# Patient Record
Sex: MALE | Race: WHITE | NOT HISPANIC OR LATINO | Employment: UNEMPLOYED | URBAN - METROPOLITAN AREA
[De-identification: names, ages, dates, MRNs, and addresses within clinical notes are randomized per-mention and may not be internally consistent; named-entity substitution may affect disease eponyms.]

---
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
## 2021-01-04 ENCOUNTER — LAB (OUTPATIENT)
Dept: LAB | Facility: MEDICAL CENTER | Age: 21
End: 2021-01-04
Payer: COMMERCIAL

## 2021-01-05 ENCOUNTER — TELEMEDICINE (OUTPATIENT)
Dept: DERMATOLOGY | Facility: CLINIC | Age: 21
End: 2021-01-05
Payer: COMMERCIAL

## 2021-01-05 DIAGNOSIS — L70.0 ACNE VULGARIS: Primary | ICD-10-CM

## 2021-01-05 DIAGNOSIS — Z79.899 HIGH RISK MEDICATION USE: ICD-10-CM

## 2021-01-05 PROCEDURE — 99214 OFFICE O/P EST MOD 30 MIN: CPT | Performed by: DERMATOLOGY

## 2021-01-05 PROCEDURE — 1036F TOBACCO NON-USER: CPT | Performed by: DERMATOLOGY

## 2021-01-05 RX ORDER — ISOTRETINOIN 30 MG/1
30 CAPSULE ORAL 2 TIMES DAILY
Qty: 60 CAPSULE | Refills: 0 | Status: SHIPPED | OUTPATIENT
Start: 2021-01-05 | End: 2021-02-17 | Stop reason: SDUPTHER

## 2021-01-05 NOTE — PROGRESS NOTES
Virtual Regular Visit      Assessment/Plan:    Problem List Items Addressed This Visit        Musculoskeletal and Integument    Acne vulgaris - Primary               Reason for visit is accutane follow up  Chief Complaint   Patient presents with    Virtual Regular Visit        Encounter provider Dom Louise MD    Provider located at [address] [place] [address] [address] [facility]  [phone]      Recent Visits  No visits were found meeting these conditions  Showing recent visits within past 7 days and meeting all other requirements     Today's Visits  Date Type Provider Dept   01/05/21 Telemedicine Dom Louise MD [facility] [place]   Showing today's visits and meeting all other requirements     Future Appointments  No visits were found meeting these conditions  Showing future appointments within next 150 days and meeting all other requirements        The patient was identified by name and date of birth  Vaughn Pantoja was informed that this is a telemedicine visit and that the visit is being conducted through [facility] [place] and patient was informed that this is not a secure, HIPAA-compliant platform  He agrees to proceed     My office door was closed  The patient was notified the following individuals were present in the room mamie van  He acknowledged consent and understanding of privacy and security of the video platform  The patient has agreed to participate and understands they can discontinue the visit at any time  Patient is aware this is a billable service  Lani Gonzalez is a 24 y o  male          HPI     Past Medical History:   Diagnosis Date    Acne     [facility]izure [facility])        Past Surgical History:   Procedure Laterality Date    DENTAL SURGERY         Current Outpatient Medications   Medication Sig Dispense Refill    clindamycin (CLEOCIN T) 1 % lotion Apply topically 2 (two) times a day 60 mL 11    dexmethylphenidate (FOCALIN XR) 10 MG 24 hr capsule Take 1 cap by mouth in am      dexmethylphenidate (FOCALIN XR) 5 MG 24 hr capsule Take 1 cap by mouth after school      dexmethylphenidate (FOCALIN) 2 5 MG tablet 1 tab after school      ISOtretinoin (ACCUTANE) 30 MG capsule Take 1 capsule (30 mg total) by mouth daily ipledge [id number] 30 capsule 0    sertraline (ZOLOFT) 50 mg tablet Take 50 mg by mouth daily       No current facility-administered medications for this visit  No Known Allergies    Review of Systems    Video Exam    Vitals:       Physical Exam     I spent 20 minutes directly with the patient during this visit      VIRTUAL VISIT DISCLAIMER    Catrina Neumann acknowledges that he has consented to an online visit or consultation  He understands that the online visit is based solely on information provided by him, and that, in the absence of a face-to-face physical evaluation by the physician, the diagnosis he receives is both limited and provisional in terms of accuracy and completeness  This is not intended to replace a full medical face-to-face evaluation by the physician  Catrina Neumann understands and accepts these terms  [address] [facility] VIRTUAL Clinic Note     Patient Name: Catrina Neumann  Encounter Date: 01/05/2021  If patient is a minor, he/she is accompanied, virtually, by person claiming to be:      Have you been cared for by a [facility] Dermatologist in the last 3 years and, if so, which one? YES, Dr Lea Calixto     · Have you traveled outside of the [address] [place] [address] in the past 3 months or outside of the [facility] area in the last 2 weeks? No     May we call your Preferred Phone number to discuss your specific medical information? Yes     May we leave a detailed message that includes your specific medical information?         Yes      Today's Chief Concerns:   Concern #1:  Accutane follow up       Past Medical History:  Have you personally ever had or currently have any of the following? · Skin cancer (such as Melanoma, Basal Cell Carcinoma, Squamous Cell Carcinoma? (If Yes, please provide more detail)- No  · Eczema: No  · Psoriasis: No  · HIV/AIDS: No  · Hepatitis B or C: No  · Tuberculosis: No  · Systemic Immunosuppression such as Diabetes, Biologic or Immunotherapy, Chemotherapy, Organ Transplantation, Bone Marrow Transplantation (If YES, please provide more detail): No  · Radiation Treatment (If YES, please provide more detail): No  · Any other major medical conditions/concerns? (If Yes, which types)- No    Social History:     What is/was your primary occupation? Denies      What are your hobbies/past-times? Denies     Family History:  Have any of your "first degree relatives" (parent, brother, sister, or child) had any of the following       · Skin cancer such as Melanoma or Merkel Cell Carcinoma or Pancreatic Cancer? No  · Eczema, Asthma, Hay Fever or Seasonal Allergies: No  · Psoriasis or Psoriatic Arthritis: No  · Do any other medical conditions seem to run in your family? If Yes, what condition and which relatives? No    Current Medications:         Current Outpatient Medications:     clindamycin (CLEOCIN T) 1 % lotion, Apply topically 2 (two) times a day, Disp: 60 mL, Rfl: 11    dexmethylphenidate (FOCALIN XR) 10 MG 24 hr capsule, Take 1 cap by mouth in am, Disp: , Rfl:     dexmethylphenidate (FOCALIN XR) 5 MG 24 hr capsule, Take 1 cap by mouth after school, Disp: , Rfl:     dexmethylphenidate (FOCALIN) 2 5 MG tablet, 1 tab after school, Disp: , Rfl:     ISOtretinoin (ACCUTANE) 30 MG capsule, Take 1 capsule (30 mg total) by mouth daily ipledge [id number], Disp: 30 capsule, Rfl: 0    sertraline (ZOLOFT) 50 mg tablet, Take 50 mg by mouth daily, Disp: , Rfl:       Review of Systems:  Have you recently had or currently have any of the following? If YES, what are you doing for the problem?     · Fever, chills or unintended weight loss: No  · Sudden loss or change in your vision: No  · Nausea, vomiting or blood in your stool: No  · Painful or swollen joints: No  · Wheezing or cough: No  · Changing mole or non-healing wound: No  · Nosebleeds: No  · Excessive sweating: No  · Easy or prolonged bleeding? No  · Over the last 2 weeks, how often have you been bothered by the following problems? · Taking little interest or pleasure in doing things: 1-3 times a week  · Feeling down, depressed, or hopeless: 1 - Not at All  · Rapid heartbeat with epinephrine:  No    · FEMALES ONLY:    · Are you pregnant or planning to become pregnant? No  · Are you currently or planning to be nursing or breast feeding? No    · Any known allergies?      No Known Allergies      Physical Exam:     Was a chaperone (Derm Clinical Assistant) present throughout the entire virtual Physical Exam? Yes    o     CONSTITUTIONAL:   Appearance: alert, well appearing, and in no distress  PSYCH: Normal mood and affect  EYES: Normal appearing eyes with normal color; no obvious deformities  ENT: Normal ears, nose, lips and neck with normal color and no obvious deformities; no obvious difficulty swallowing  CARDIOVASCULAR: No obvious edema; no obvious jugular venous distension  RESPIRATORY: Normal appearing respirations; no obvious shortness of breath  HEME/LYMPH/IMMUNO:  Normal color without obvious pallor, jaundice, petechiae or bleeding; no obvious cervical chain masses    SKIN:  FULL ORGAN SYSTEM EXAM  Hair, Scalp, Ears, Face Normal except as noted below in Assessment   Neck, Cervical Chain Nodes    Right Arm/Hand/Fingers    Left Arm/Hand/Fingers    Chest/Breasts/Axillae    Abdomen, Umbilicus    Back/Spine Normal except as noted below in Assessment   Groin/Genitalia/Buttocks    Right Leg, Foot, Toes    Left Leg, Foot, Toes         Assessment and Plan by Diagnosis:    History of Present Condition:     Duration:  How long has this been an issue for you?    o  years    Location Affected:  Where on the body is this affecting you?    o  face and shoulders    Quality:  Is there any bleeding, pain, itch, burning/irritation, or redness associated with the skin lesion? o  denies    Severity:  Describe any bleeding, pain, itch, burning/irritation, or redness on a scale of 1 to 10 (with 10 being the worst)  o  denies    Timing:  Does this condition seem to be there pretty constantly or do you notice it more at specific times throughout the day? o  constant    Context:  Have you ever noticed that this condition seems to be associated with specific activities you do?    o  denies    Modifying Factors:    o Anything that seems to make the condition worse?    -  denies   o What have you tried to do to make the condition better?    -  accutane    Associated Signs and Symptoms:  Does this skin lesion seem to be associated with any of the following:  o  denies      1  ISOTRETINOIN "ACCUTANE": MALE    Age:20 y o    Weight: unable to obtain virtual visit   Pulse: unable to obtain virtual visit   Blood Pressure: unable to obtain virtual visit     Ipledge number: [id number]  Last 4 digits SS: 1957      "Goal Dose" in mg (125 mg x weight in kg): 8,388 mg    Interim month   "Daily Dose" of Isotretinoin the patient has actually been taking since last visit (this is usually what was prescribed): 30 mg   "Total # of Days" patient took Isotretinoin since last visit (this is usually 30):  30 days   "Total Monthly Dose" in mg since last visit (this equals "Daily Dose" x "Total # of Days"): 900 mg    Cumulative dosage   "Total cumulative Dose" in mg (add the above "Total Monthly Dose" with "Total Cumulative Dose" from last visit: 900 mg        Symptoms   Conjunctivitis: No   Night Blindness/ Issues with night vision: No   Focusing Problems: No   Dry Lips/Eyes: YES   Dry Skin: YES   Rash: No   Nose Bleeds: No   Angular cheilitis (cracking in corner of lips): No   Headaches: No   Photosensitivity: YES   Dry Anus: No   Depression: No   Mood Changes: No   Suicidal thoughts: No   Fatigue: No   Weight Loss: No   Muscle Pain/Stiffness: No   Abdominal pain: No   Diarrhea: No   Bloody stools: No         Physical Exam   Psychiatric/Mood:    Anatomic Location Affected:  Cheeks and chin   Morphological Description:  o Open/Closed Comedones:  - Few ("Mild")  o Inflammatory Papules/Pustules:  - Few ("Mild")  o Nodules:  - Few ("Mild")  o Scarring:  - Few ("Mild")  o Excoriations:  - No evidence ("Clear")  o Local Skin Redness/Erythema:  - Few ("Mild")  o Local Skin Dryness/Scaling:  - No evidence ("Clear")  o Local Skin Dyspigmentation:  - No evidence ("Clear")   Pertinent Positives:   Pertinent Negatives:      Labs   Date of last labs: 1/04/2021   Completed: YES   Labs reviewed: within normal limits    DIAGNOSES:     Acne Vulgaris   High Risk Medication   Medication Monitoring   Xerosis (see below for patient education)    Assessment and Plan:   Target Total Dose per KILOgram:  125 mg/KILOgram (this may change this on a patient-by-patient basis)   Planned daily dose for next 30 days: 60 mg    Please remember to add patient's "Ipledge number" to actual prescription):     Return to clinic in about 1 month, please review ipledge guidelines in terms of timing (see below for patient education)   Get labs 1-2 days before next appointment: YES   Patient confirmed in iPledge: YES   Patients counseled:  - Cannot donate blood while taking isotretinoin and for 1 month after completing therapy  YES  - Do not share medication with anyone   YES  - Possible side effects discussed, including sun sensitivity, dry lips/eyes/skin, headaches, blurry vision (pseudotumor cerebri), muscle/joint aches, GI upset, bloody stools (IBD including Crohns/Ulcerative Colitis), jaundice, liver dysfunction, lipid abnormalities, bone marrow dysfunction, mood effects, thoughts of hurting oneself or others, and flaring of acne (acne fulminans/SAPPHO)  YES  - Report any side effects of mood swings or depression and stop medication upon occurrence  YES      Isotretinoin for Acne   Isotretinoin is a retinoid medication that is taken by mouth to treat severe nodular acne  Typically, it is used once other acne treatments have not worked, such as oral antibiotics  Usually isotretinoin is taken for 4 to 6 months, although the length of treatment can vary from person to person  While most patients acne improves and may even clear with this medication, in 20% of patients acne can come back  This requires additional acne treatment or even a second cycle of isotretinoin  How should I take isotretinoin?  Isotretinoin dosing is weight-based and should be taken exactly as prescribed   If you miss a dose, skip that dose  Do not take 2 doses at the same time   Take with food to help with absorption   All instructions in the iPLEDGE program packet (www Break30) that was provided must be followed (see below for highlights)   You will get a 30 day supply of isotretinoin at a time  It cannot be automatically refilled  Make certain that you have been given enough medication to last 30 days as pharmacists are unable to refill or make changes within a month   You must return to your dermatologist every month to make sure you are not having any serious side effects from isotretinoin  For female patients, this visit will always include a monthly pregnancy test  Other laboratory studies, including liver function tests, cholesterol and triglycerides, must also be conducted before and during treatment  What should I avoid while taking isotretinoin?  Do not donate blood while take isotretinoin or until 1 months after coming off the medication   Do not have cosmetic procedures to smooth your skin, including waxing, dermabrasion, or laser procedures, while taking this medication and for at least 6 months after you stop       Do not share isotretinoin with any other people  It can cause birth defects and other serious health problems   Do not use any other acne medications, including medicated washes, cleansers, creams or antibiotic pills during your treatment with isotretinoin unless expressly directed to by your dermatologist      Initiating isotretinoin & the iPLEDGE Program    The iPLEDGE Program is a strict, government-required program to prevent females from becoming pregnant while on isotretinoin  All females and males must participate  Note: Your provider must follow this program and cannot change any of the requirements   Before starting isotretinoin, your provider will talk to you about the safe use of this medication and you will need to sign consent forms in order to receive treatment   If you fail to keep appointments, you will be unable to get your prescription filled   For male patients and women of non-childbearing age: There is no waiting period  Once laboratory tests are done, treatment can start   For more information, visit: [email or web address]    What are the possible side effects of isotretinoin? What should I do? Most side effects from isotretinoin are mild and can be easily improved with simple remedies  Others are more concerning   Dry skin and eyes, chapped lips and dry nose that may lead to nosebleeds  o Dry Skin: Apply sensitive skin moisturizers to dry skin at least two times a day  You may need sunscreen (SPF 30) in the morning and to reapply when outside  o Dry Eyes: Use saline eye drops or artificial tears  o Dry Nose/Nosebleeds: Use saline nasal spray (ex  Ayr) during the day and at bedtime  To stop nosebleeds, hold pressure  If this does not work, call your dermatologist     o Chapped lips: apply petrolatum-based lip balms routinely  Avoid anything medicated   Contact your dermatologist for excessive dryness, cracks, tenderness or pain   Increased blood fats and cholesterol (usually in patients with a personal or family history of cholesterol or triglyceride problems)   Vision problems affecting your ability to see in the dark and drive at night   Bone, muscle and tendon aches can occur  Additional stretching before and after activities may help relieve aches  If you are otherwise healthy, consider the use of ibuprofen or naproxen  If you are unsure if you can use these pain medications, ask your doctor first  Also, call your doctor if you experience severe back pain, joint pain, or a broken bone    Changes in mood, including anxiety, depressive symptoms or suicidal thoughts which may or may not be temporary  Notify your doctor if your or a family member have suffered from these conditions or if you have any concerns during treatment   Serious birth defects or miscarriage can occur while taking this medication and for one month after taking your last dose of isotretinoin  You must not get pregnant while taking isotretinoin  Once the medication is out of your system, 30 days, there is no effect on the baby   Increased pressure in the brain  Call your doctor right away if you experience bad headache, blurred vision, dizziness, nausea or vomiting, or seizures   Skin rash - call your doctor right away if you develop any rashes or blisters on the skin   Liver damage - call your doctor right away if you experience severe stomach, chest or bowel pain, painful swallowing, diarrhea, blood in your stool, yellowing of your skin or eyes, or dark urine   Gastrointestinal bleeding  If you experience unusual abdominal pain or red or black/tarry stools, call your doctor immediately  You should also notify your doctor if you or a family member has a history of ulcerative colitis or Crohns disease   Worsening acne  Mild worsening of acne can occur in the first few weeks of using isotreinoin   If your acne is getting significantly worse, call your doctor  This may require temporarily stopping the isotretinoin and possibly adding other medications  XEROSIS ("DRY SKIN")    Dry skin refers to skin that feels dry to touch  Dry skin has a dull surface with a rough, scaly quality  The skin is less pliable and cracked  When dryness is severe, the skin may become inflamed and fissured  Although any body site can be dry, dry skin tends to affect the shins more than any other site  Dry skin is lacking moisture in the outer horny cell layer (stratum corneum) and this results in cracks in the skin surface  Dry skin is also called xerosis, xeroderma or asteatosis (lack of fat)  It can affect males and females of all ages  There is some racial variability in water and lipid content of the skin   Dry skin that starts in early childhood may be one of about 20 types of ichthyosis (fish-scale skin)  There is often a family history of dry skin   Dry skin is commonly seen in people with atopic dermatitis   Nearly everyone > 60 years has dry skin  Dry skin that begins later may be seen in people with certain diseases and conditions   Postmenopausal women   Hypothyroidism   Chronic renal disease    Malnutrition and weight loss    Subclinical dermatitis    Treatment with certain drugs such as oral retinoids, diuretics and epidermal growth factor receptor inhibitors    People exposed to a dry environment may experience dry skin   Low humidity: in desert climates or cool, windy conditions    Excessive air conditioning    Direct heat from a fire or fan heater    Excessive bathing    Contact with soap, detergents and solvents    Inappropriate topical agents such as alcohol    Frictional irritation from rough clothing or abrasives    Dry skin is due to abnormalities in the integrity of the barrier function of the stratum corneum, which is made up of corneocytes     There is an overall reduction in the lipids in the stratum corneum   Ratio of ceramides, cholesterol and free fatty acids may be normal or altered   There may be a reduction in the proliferation of keratinocytes   Keratinocyte subtypes change in dry skin with a decrease in keratins K1, K10 and increase in K5, K14     Involucrin (a protein) may be expressed early, increasing cell stiffness   The result is retention of corneocytes and reduced water-holding capacity  What is the treatment for dry skin? The mainstay of treatment of dry skin and ichthyosis is moisturisers/emollients  They should be applied liberally and often enough to:   Reduce itch    Improve the barrier function    Prevent entry of irritants, bacteria    Reduce transepidermal water loss  When considering which emollient is most suitable, consider:   Severity of the dryness    Tolerance    Personal preference    Cost and availability  Emollients generally work best if applied to damp skin, if pH is below 7 (acidic), and if containing humectants such as urea or propylene glycol  Additional treatments include:   Topical steroid if itchy or there is dermatitis -- choose an emollient base    Topical calcineurin inhibitors if topical steroids are unsuitable  How can dry skin be prevented? Eliminate aggravating factors:   Reduce the frequency of bathing   A humidifier in winter and air conditioner in summer    Compare having a short shower with a prolonged soak in a bath   Use lukewarm, not hot, water   Replace standard soap with a substitute such as a synthetic detergent cleanser, water-miscible emollient, bath oil, anti-pruritic tar oil, colloidal oatmeal etc     Apply an emollient liberally and often, particularly shortly after bathing, and when itchy  The drier the skin, the thicker this should be, especially on the hands  What is the outlook for dry skin?   A tendency to dry skin may persist life-long, or it may improve once contributing factors are controlled    Scribe Attestation    I,:  Win Murphy MA am acting as a scribe while in the presence of the attending physician :       I,:  Lea Du MD personally performed the services described in this documentation    as scribed in my presence :

## 2021-01-15 ENCOUNTER — TELEPHONE (OUTPATIENT)
Dept: DERMATOLOGY | Facility: CLINIC | Age: 21
End: 2021-01-15

## 2021-02-09 ENCOUNTER — TELEMEDICINE (OUTPATIENT)
Dept: DERMATOLOGY | Facility: CLINIC | Age: 21
End: 2021-02-09
Payer: COMMERCIAL

## 2021-02-09 DIAGNOSIS — L70.0 ACNE VULGARIS: Primary | ICD-10-CM

## 2021-02-09 DIAGNOSIS — Z79.899 HIGH RISK MEDICATION USE: ICD-10-CM

## 2021-02-09 PROCEDURE — 99213 OFFICE O/P EST LOW 20 MIN: CPT | Performed by: DERMATOLOGY

## 2021-02-09 NOTE — PROGRESS NOTES
Virtual Regular Visit      Assessment/Plan:    Problem List Items Addressed This Visit        Musculoskeletal and Integument    Acne vulgaris - Primary               Reason for visit is Accutane follow up   Chief Complaint   Patient presents with    Virtual Regular Visit        Encounter provider Brooklyn Cantu MD    Provider located at [address] [place] [address]  [address]  [phone]      Recent Visits  No visits were found meeting these conditions  Showing recent visits within past 7 days and meeting all other requirements     Today's Visits  Date Type Provider Dept   02/09/21 Telemedicine Brooklyn Cantu MD [facility] [place]   Showing today's visits and meeting all other requirements     Future Appointments  No visits were found meeting these conditions  Showing future appointments within next 150 days and meeting all other requirements        The patient was identified by name and date of birth  Anna Flores was informed that this is a telemedicine visit and that the visit is being conducted through [facility] and patient was informed that this is not a secure, HIPAA-compliant platform  He agrees to proceed     My office door was closed  The patient was notified the following individuals were present in the room mamie van  He acknowledged consent and understanding of privacy and security of the video platform  The patient has agreed to participate and understands they can discontinue the visit at any time  Patient is aware this is a billable service  Shahid Gordillo is a 24 y o  male          HPI     Past Medical History:   Diagnosis Date    Acne     Seizure [facility])        Past Surgical History:   Procedure Laterality Date    DENTAL SURGERY         Current Outpatient Medications   Medication Sig Dispense Refill    dexmethylphenidate (FOCALIN XR) 10 MG 24 hr capsule Take 1 cap by mouth in am      dexmethylphenidate (FOCALIN XR) 5 MG 24 hr capsule Take 1 cap by mouth after school      ISOtretinoin (ACCUTANE) 30 MG capsule Take 1 capsule (30 mg total) by mouth 2 (two) times a day Ipledge # [id number] 60 capsule 0    sertraline (ZOLOFT) 50 mg tablet Take 50 mg by mouth daily      clindamycin (CLEOCIN T) 1 % lotion Apply topically 2 (two) times a day (Patient not taking: Reported on 2/9/2021) 60 mL 11    dexmethylphenidate (FOCALIN) 2 5 MG tablet 1 tab after school       No current facility-administered medications for this visit  No Known Allergies    Review of Systems    Video Exam    Vitals:       Physical Exam     I spent 20 minutes directly with the patient during this visit      VIRTUAL VISIT DISCLAIMER    Jake Metzger acknowledges that he has consented to an online visit or consultation  He understands that the online visit is based solely on information provided by him, and that, in the absence of a face-to-face physical evaluation by the physician, the diagnosis he receives is both limited and provisional in terms of accuracy and completeness  This is not intended to replace a full medical face-to-face evaluation by the physician  Joseph Yassine understands and accepts these terms  [address] [facility] VIRTUAL Clinic Note Follow Up Patient     Patient Name: Jake Metzger  Encounter Date: 02/09/2021  If patient is a minor, he/she is accompanied, virtually, by person claiming to be:      Have you been cared for by a [facility] Dermatologist in the last 3 years and, if so, which one? YES    · Have you traveled outside of the [address] [place] [address] in the past 3 months or outside of the [facility] area in the last 2 weeks? No     May we call your Preferred Phone number to discuss your specific medical information? Yes     May we leave a detailed message that includes your specific medical information?         Yes      Today's Chief Concerns:   Concern #1:  Accutane follow up Current Medications:       Current Outpatient Medications:     dexmethylphenidate (FOCALIN XR) 10 MG 24 hr capsule, Take 1 cap by mouth in am, Disp: , Rfl:     dexmethylphenidate (FOCALIN XR) 5 MG 24 hr capsule, Take 1 cap by mouth after school, Disp: , Rfl:     ISOtretinoin (ACCUTANE) 30 MG capsule, Take 1 capsule (30 mg total) by mouth 2 (two) times a day Ipledge # [id number], Disp: 60 capsule, Rfl: 0    sertraline (ZOLOFT) 50 mg tablet, Take 50 mg by mouth daily, Disp: , Rfl:     clindamycin (CLEOCIN T) 1 % lotion, Apply topically 2 (two) times a day (Patient not taking: Reported on 2/9/2021), Disp: 60 mL, Rfl: 11    dexmethylphenidate (FOCALIN) 2 5 MG tablet, 1 tab after school, Disp: , Rfl:       Review of Systems:  Have you recently had or currently have any of the following? If YES, what are you doing for the problem? · Fever, chills or unintended weight loss: No  · Sudden loss or change in your vision: No  · Nausea, vomiting or blood in your stool: No  · Painful or swollen joints: No  · Wheezing or cough: No  · Changing mole or non-healing wound: No  · Nosebleeds: No  · Excessive sweating: No  · Easy or prolonged bleeding? No  · Over the last 2 weeks, how often have you been bothered by the following problems? · Taking little interest or pleasure in doing things: 1 - Not at All  · Feeling down, depressed, or hopeless: 1 - Not at All  · Rapid heartbeat with epinephrine:  No    · FEMALES ONLY:    · Are you pregnant or planning to become pregnant? N/A  · Are you currently or planning to be nursing or breast feeding? N/A    · Any known allergies?      No Known Allergies      Physical Exam:     Was a chaperone (Derm Clinical Assistant) present throughout the entire virtual Physical Exam? Yes  o     CONSTITUTIONAL:   Appearance: alert, well appearing, and in no distress  PSYCH: Normal mood and affect  EYES: Normal appearing eyes with normal color; no obvious deformities  ENT: Normal ears, nose, lips and neck with normal color and no obvious deformities; no obvious difficulty swallowing  CARDIOVASCULAR: No obvious edema; no obvious jugular venous distension  RESPIRATORY: Normal appearing respirations; no obvious shortness of breath  HEME/LYMPH/IMMUNO:  Normal color without obvious pallor, jaundice, petechiae or bleeding; no obvious cervical chain masses    SKIN:  FULL ORGAN SYSTEM EXAM  Hair, Scalp, Ears, Face Normal except as noted below in Assessment   Neck, Cervical Chain Nodes Normal except as noted below in Assessment                       Groin/Genitalia/Buttocks    Right Leg, Foot, Toes    Left Leg, Foot, Toes         Assessment and Plan by Diagnosis:      ISOTRETINOIN "ACCUTANE": MALE    Age:20 y o    Weight: unable to obtain virtual visit   Pulse: unable to obtain virtual visit   Blood Pressure: unable to obtain virtual visit     Ipledge number: [id number]  Last 4 digits SS: 1957      "Goal Dose" in mg (125 mg x weight in kg): 8,388 mg    Interim month   "Daily Dose" of Isotretinoin the patient has actually been taking since last visit (this is usually what was prescribed): 45 mg   "Total # of Days" patient took Isotretinoin since last visit (this is usually 30):  30 days   "Total Monthly Dose" in mg since last visit (this equals "Daily Dose" x "Total # of Days"): 1,350 mg    Cumulative dosage   "Total cumulative Dose" in mg (add the above "Total Monthly Dose" with "Total Cumulative Dose" from last visit: 2,250 mg        Symptoms   Conjunctivitis: No   Night Blindness/ Issues with night vision: No   Focusing Problems: No   Dry Lips/Eyes: YES   Dry Skin: YES   Rash: No   Nose Bleeds: No   Angular cheilitis (cracking in corner of lips): No   Headaches: No   Photosensitivity: YES   Dry Anus: No   Depression: No   Mood Changes: No   Suicidal thoughts: No   Fatigue: No   Weight Loss: No   Muscle Pain/Stiffness: No   Abdominal pain: No   Diarrhea: No   Bloody stools: No         Physical Exam   Psychiatric/Mood: normal    Anatomic Location Affected:  Face,     Morphological Description:  o Open/Closed Comedones:  - No evidence ("Clear")  o Inflammatory Papules/Pustules:  - Few ("Mild")  o Nodules:  - Rare ("Almost Clear")  o Scarring:  - Few ("Mild")  o Excoriations:  - No evidence ("Clear")  o Local Skin Redness/Erythema:  - Few ("Mild")  o Local Skin Dryness/Scaling:  - No evidence ("Clear")  o Local Skin Dyspigmentation:  - No evidence ("Clear")   Pertinent Positives:   Pertinent Negatives:        DIAGNOSES:     Acne Vulgaris   High Risk Medication   Medication Monitoring   Xerosis (see below for patient education)    Assessment and Plan:   Target Total Dose per KILOgram:  125 mg/KILOgram (this may change this on a patient-by-patient basis)   Planned daily dose for next 30 days: 60 mg    Please remember to add patient's "Ipledge number" to actual prescription):     Return to clinic in about 1 month, please review ipledge guidelines in terms of timing (see below for patient education)   Get labs 1-2 days before next appointment: YES   Patient confirmed in iPledge: no   Patients counseled:  - Cannot donate blood while taking isotretinoin and for 1 month after completing therapy  YES  - Do not share medication with anyone  YES  - Possible side effects discussed, including sun sensitivity, dry lips/eyes/skin, headaches, blurry vision (pseudotumor cerebri), muscle/joint aches, GI upset, bloody stools (IBD including Crohns/Ulcerative Colitis), jaundice, liver dysfunction, lipid abnormalities, bone marrow dysfunction, mood effects, thoughts of hurting oneself or others, and flaring of acne (acne fulminans/SAPPHO)  YES  - Report any side effects of mood swings or depression and stop medication upon occurrence  YES      Isotretinoin for Acne   Isotretinoin is a retinoid medication that is taken by mouth to treat severe nodular acne   Typically, it is used once other acne treatments have not worked, such as oral antibiotics  Usually isotretinoin is taken for 4 to 6 months, although the length of treatment can vary from person to person  While most patients acne improves and may even clear with this medication, in 20% of patients acne can come back  This requires additional acne treatment or even a second cycle of isotretinoin  How should I take isotretinoin?  Isotretinoin dosing is weight-based and should be taken exactly as prescribed   If you miss a dose, skip that dose  Do not take 2 doses at the same time   Take with food to help with absorption   All instructions in the iPLEDGE program packet (www HelpAround) that was provided must be followed (see below for highlights)   You will get a 30 day supply of isotretinoin at a time  It cannot be automatically refilled  Make certain that you have been given enough medication to last 30 days as pharmacists are unable to refill or make changes within a month   You must return to your dermatologist every month to make sure you are not having any serious side effects from isotretinoin  For female patients, this visit will always include a monthly pregnancy test  Other laboratory studies, including liver function tests, cholesterol and triglycerides, must also be conducted before and during treatment  What should I avoid while taking isotretinoin?  Do not donate blood while take isotretinoin or until 1 months after coming off the medication   Do not have cosmetic procedures to smooth your skin, including waxing, dermabrasion, or laser procedures, while taking this medication and for at least 6 months after you stop   Do not share isotretinoin with any other people  It can cause birth defects and other serious health problems      Do not use any other acne medications, including medicated washes, cleansers, creams or antibiotic pills during your treatment with isotretinoin unless expressly directed to by your dermatologist      Initiating isotretinoin & the iPLEDGE Program    The iPLEDGE Program is a strict, government-required program to prevent females from becoming pregnant while on isotretinoin  All females and males must participate  Note: Your provider must follow this program and cannot change any of the requirements   Before starting isotretinoin, your provider will talk to you about the safe use of this medication and you will need to sign consent forms in order to receive treatment   If you fail to keep appointments, you will be unable to get your prescription filled   For male patients and women of non-childbearing age: There is no waiting period  Once laboratory tests are done, treatment can start   For more information, visit: [email or web address]    What are the possible side effects of isotretinoin? What should I do? Most side effects from isotretinoin are mild and can be easily improved with simple remedies  Others are more concerning   Dry skin and eyes, chapped lips and dry nose that may lead to nosebleeds  o Dry Skin: Apply sensitive skin moisturizers to dry skin at least two times a day  You may need sunscreen (SPF 30) in the morning and to reapply when outside  o Dry Eyes: Use saline eye drops or artificial tears  o Dry Nose/Nosebleeds: Use saline nasal spray (ex  Ayr) during the day and at bedtime  To stop nosebleeds, hold pressure  If this does not work, call your dermatologist     o Chapped lips: apply petrolatum-based lip balms routinely  Avoid anything medicated   Contact your dermatologist for excessive dryness, cracks, tenderness or pain   Increased blood fats and cholesterol (usually in patients with a personal or family history of cholesterol or triglyceride problems)   Vision problems affecting your ability to see in the dark and drive at night   Bone, muscle and tendon aches can occur  Additional stretching before and after activities may help relieve aches  If you are otherwise healthy, consider the use of ibuprofen or naproxen  If you are unsure if you can use these pain medications, ask your doctor first  Also, call your doctor if you experience severe back pain, joint pain, or a broken bone    Changes in mood, including anxiety, depressive symptoms or suicidal thoughts which may or may not be temporary  Notify your doctor if your or a family member have suffered from these conditions or if you have any concerns during treatment   Serious birth defects or miscarriage can occur while taking this medication and for one month after taking your last dose of isotretinoin  You must not get pregnant while taking isotretinoin  Once the medication is out of your system, 30 days, there is no effect on the baby   Increased pressure in the brain  Call your doctor right away if you experience bad headache, blurred vision, dizziness, nausea or vomiting, or seizures   Skin rash - call your doctor right away if you develop any rashes or blisters on the skin   Liver damage - call your doctor right away if you experience severe stomach, chest or bowel pain, painful swallowing, diarrhea, blood in your stool, yellowing of your skin or eyes, or dark urine   Gastrointestinal bleeding  If you experience unusual abdominal pain or red or black/tarry stools, call your doctor immediately  You should also notify your doctor if you or a family member has a history of ulcerative colitis or Crohns disease   Worsening acne  Mild worsening of acne can occur in the first few weeks of using isotreinoin  If your acne is getting significantly worse, call your doctor  This may require temporarily stopping the isotretinoin and possibly adding other medications  XEROSIS ("DRY SKIN")    Dry skin refers to skin that feels dry to touch  Dry skin has a dull surface with a rough, scaly quality   The skin is less pliable and cracked  When dryness is severe, the skin may become inflamed and fissured  Although any body site can be dry, dry skin tends to affect the shins more than any other site  Dry skin is lacking moisture in the outer horny cell layer (stratum corneum) and this results in cracks in the skin surface  Dry skin is also called xerosis, xeroderma or asteatosis (lack of fat)  It can affect males and females of all ages  There is some racial variability in water and lipid content of the skin   Dry skin that starts in early childhood may be one of about 20 types of ichthyosis (fish-scale skin)  There is often a family history of dry skin   Dry skin is commonly seen in people with atopic dermatitis   Nearly everyone > 60 years has dry skin  Dry skin that begins later may be seen in people with certain diseases and conditions   Postmenopausal women   Hypothyroidism   Chronic renal disease    Malnutrition and weight loss    Subclinical dermatitis    Treatment with certain drugs such as oral retinoids, diuretics and epidermal growth factor receptor inhibitors    People exposed to a dry environment may experience dry skin   Low humidity: in desert climates or cool, windy conditions    Excessive air conditioning    Direct heat from a fire or fan heater    Excessive bathing    Contact with soap, detergents and solvents    Inappropriate topical agents such as alcohol    Frictional irritation from rough clothing or abrasives    Dry skin is due to abnormalities in the integrity of the barrier function of the stratum corneum, which is made up of corneocytes   There is an overall reduction in the lipids in the stratum corneum   Ratio of ceramides, cholesterol and free fatty acids may be normal or altered   There may be a reduction in the proliferation of keratinocytes   Keratinocyte subtypes change in dry skin with a decrease in keratins K1, K10 and increase in K5, K14   Involucrin (a protein) may be expressed early, increasing cell stiffness   The result is retention of corneocytes and reduced water-holding capacity  What is the treatment for dry skin? The mainstay of treatment of dry skin and ichthyosis is moisturisers/emollients  They should be applied liberally and often enough to:   Reduce itch    Improve the barrier function    Prevent entry of irritants, bacteria    Reduce transepidermal water loss  When considering which emollient is most suitable, consider:   Severity of the dryness    Tolerance    Personal preference    Cost and availability  Emollients generally work best if applied to damp skin, if pH is below 7 (acidic), and if containing humectants such as urea or propylene glycol  Additional treatments include:   Topical steroid if itchy or there is dermatitis -- choose an emollient base    Topical calcineurin inhibitors if topical steroids are unsuitable  How can dry skin be prevented? Eliminate aggravating factors:   Reduce the frequency of bathing   A humidifier in winter and air conditioner in summer    Compare having a short shower with a prolonged soak in a bath   Use lukewarm, not hot, water   Replace standard soap with a substitute such as a synthetic detergent cleanser, water-miscible emollient, bath oil, anti-pruritic tar oil, colloidal oatmeal etc     Apply an emollient liberally and often, particularly shortly after bathing, and when itchy  The drier the skin, the thicker this should be, especially on the hands  What is the outlook for dry skin? A tendency to dry skin may persist life-long, or it may improve once contributing factors are controlled    Scribe Attestation    I,:  Guillermina Finney MA am acting as a scribe while in the presence of the attending physician :       I,:  Janet Muñoz MD personally performed the services described in this documentation    as scribed in my presence :

## 2021-02-13 ENCOUNTER — LAB (OUTPATIENT)
Dept: LAB | Facility: MEDICAL CENTER | Age: 21
End: 2021-02-13
Payer: COMMERCIAL

## 2021-02-17 DIAGNOSIS — L70.0 ACNE VULGARIS: ICD-10-CM

## 2021-02-17 RX ORDER — ISOTRETINOIN 30 MG/1
30 CAPSULE ORAL 2 TIMES DAILY
Qty: 60 CAPSULE | Refills: 0 | Status: SHIPPED | OUTPATIENT
Start: 2021-02-17 | End: 2021-03-17 | Stop reason: SDUPTHER

## 2021-02-23 ENCOUNTER — TELEPHONE (OUTPATIENT)
Dept: DERMATOLOGY | Facility: CLINIC | Age: 21
End: 2021-02-23

## 2021-02-23 NOTE — TELEPHONE ENCOUNTER
Left message for patient  Stating the labs were good and the Accutane was sent to the [facility] pharmacy

## 2021-02-23 NOTE — TELEPHONE ENCOUNTER
Regarding: labs  ----- Message from Marimar Bowie MD sent at 2/22/2021  2:59 PM EST -----  Please call patient  labs look good! I sent the new prescription for isotretinoin 60 mg daily to [facility]      ----- Message sent from Marimar Bowie MD to [employer] at 2/17/2021  6:14 PM -----   Your labs look good! I sent the new prescription for isotretinoin 60 mg daily to [address] [place] [address]

## 2021-03-16 ENCOUNTER — TELEMEDICINE (OUTPATIENT)
Dept: DERMATOLOGY | Facility: CLINIC | Age: 21
End: 2021-03-16
Payer: COMMERCIAL

## 2021-03-16 DIAGNOSIS — Z79.899 HIGH RISK MEDICATION USE: ICD-10-CM

## 2021-03-16 DIAGNOSIS — L70.0 ACNE VULGARIS: Primary | ICD-10-CM

## 2021-03-16 PROCEDURE — 99213 OFFICE O/P EST LOW 20 MIN: CPT | Performed by: DERMATOLOGY

## 2021-03-16 PROCEDURE — 1036F TOBACCO NON-USER: CPT | Performed by: DERMATOLOGY

## 2021-03-16 NOTE — PROGRESS NOTES
Virtual Regular Visit      Assessment/Plan:    Problem List Items Addressed This Visit     None               Reason for visit is accutane follow up   Chief Complaint   Patient presents with    Virtual Regular Visit        Encounter provider Candy Baer MD    Provider located at [address] [place] [address]  [facility]  [phone]      Recent Visits  No visits were found meeting these conditions  Showing recent visits within past 7 days and meeting all other requirements     Today's Visits  Date Type Provider Dept   03/16/21 Telemedicine Candy Baer MD  [facility]   Showing today's visits and meeting all other requirements     Future Appointments  No visits were found meeting these conditions  Showing future appointments within next 150 days and meeting all other requirements        The patient was identified by name and date of birth  Benjamin Solomon was informed that this is a telemedicine visit and that the visit is being conducted through [employer] and patient was informed that this is not a secure, HIPAA-compliant platform  He agrees to proceed     My office door was closed  The patient was notified the following individuals were present in the room mamie van  He acknowledged consent and understanding of privacy and security of the video platform  The patient has agreed to participate and understands they can discontinue the visit at any time  Patient is aware this is a billable service  Sarah Stevens is a 24 y o  male          HPI     Past Medical History:   Diagnosis Date    Acne     Seizure [facility])        Past Surgical History:   Procedure Laterality Date    DENTAL SURGERY         Current Outpatient Medications   Medication Sig Dispense Refill    dexmethylphenidate (FOCALIN XR) 10 MG 24 hr capsule Take 1 cap by mouth in am      dexmethylphenidate (FOCALIN XR) 5 MG 24 hr capsule Take 1 cap by mouth after school      dexmethylphenidate (FOCALIN) 2 5 MG tablet 1 tab after school      ISOtretinoin (ACCUTANE) 30 MG capsule Take 1 capsule (30 mg total) by mouth 2 (two) times a day Ipledge # [id number] 60 capsule 0    sertraline (ZOLOFT) 50 mg tablet Take 50 mg by mouth daily      clindamycin (CLEOCIN T) 1 % lotion Apply topically 2 (two) times a day (Patient not taking: Reported on 2/9/2021) 60 mL 11     No current facility-administered medications for this visit  No Known Allergies    Review of Systems    Video Exam    Vitals:       Physical Exam     I spent 20 minutes directly with the patient during this visit      VIRTUAL VISIT DISCLAIMER    Shaunabdifatah Vieyra acknowledges that he has consented to an online visit or consultation  He understands that the online visit is based solely on information provided by him, and that, in the absence of a face-to-face physical evaluation by the physician, the diagnosis he receives is both limited and provisional in terms of accuracy and completeness  This is not intended to replace a full medical face-to-face evaluation by the physician  John Vieyra understands and accepts these terms  [address] [facility] VIRTUAL Clinic Note Follow Up Patient     Patient Name: John Vieyra  Encounter Date: 03/16/2021  If patient is a minor, he/she is accompanied, virtually, by person claiming to be:      Have you been cared for by a [facility] Dermatologist in the last 3 years and, if so, which one? YES    · Have you traveled outside of the [address] [place] [address] in the past 3 months or outside of the [facility] area in the last 2 weeks? No     May we call your Preferred Phone number to discuss your specific medical information? Yes     May we leave a detailed message that includes your specific medical information?         Yes      Today's Chief Concerns:   Concern #1:  Accutane follow up         Current Medications:         Current Outpatient Medications:   dexmethylphenidate (FOCALIN XR) 10 MG 24 hr capsule, Take 1 cap by mouth in am, Disp: , Rfl:     dexmethylphenidate (FOCALIN XR) 5 MG 24 hr capsule, Take 1 cap by mouth after school, Disp: , Rfl:     dexmethylphenidate (FOCALIN) 2 5 MG tablet, 1 tab after school, Disp: , Rfl:     ISOtretinoin (ACCUTANE) 30 MG capsule, Take 1 capsule (30 mg total) by mouth 2 (two) times a day Ipledge # [id number], Disp: 60 capsule, Rfl: 0    sertraline (ZOLOFT) 50 mg tablet, Take 50 mg by mouth daily, Disp: , Rfl:     clindamycin (CLEOCIN T) 1 % lotion, Apply topically 2 (two) times a day (Patient not taking: Reported on 2/9/2021), Disp: 60 mL, Rfl: 11      Review of Systems:  Have you recently had or currently have any of the following? If YES, what are you doing for the problem? · Fever, chills or unintended weight loss: No  · Sudden loss or change in your vision: No  · Nausea, vomiting or blood in your stool: No  · Painful or swollen joints: No  · Wheezing or cough: No  · Changing mole or non-healing wound: No  · Nosebleeds: No  · Excessive sweating: No  · Easy or prolonged bleeding? No  · Over the last 2 weeks, how often have you been bothered by the following problems? · Taking little interest or pleasure in doing things: 1 - Not at All  · Feeling down, depressed, or hopeless: 1 - Not at All  · Rapid heartbeat with epinephrine:  No    · FEMALES ONLY:    · Are you pregnant or planning to become pregnant? No  · Are you currently or planning to be nursing or breast feeding? No    · Any known allergies?      No Known Allergies      Physical Exam:     Was a chaperone (Derm Clinical Assistant) present throughout the entire virtual Physical Exam? Yes     Did the Dermatology Team specifically  the patient on the technical and practical limitations of a virtual Physical Exam? Yes}  o Did the patient ultimately request or accept a virtual Physical Exam?  Yes  o Did the patient specifically refuse to have the areas "under-the-bra" examined by the Dermatologist? Haleigh luis Did the patient specifically refuse to have the areas "under-the-underwear" examined by the Dermatologist? YES    CONSTITUTIONAL:   Appearance: alert, well appearing, and in no distress  PSYCH: Normal mood and affect  EYES: Normal appearing eyes with normal color; no obvious deformities  ENT: Normal ears, nose, lips and neck with normal color and no obvious deformities; no obvious difficulty swallowing  CARDIOVASCULAR: No obvious edema; no obvious jugular venous distension  RESPIRATORY: Normal appearing respirations; no obvious shortness of breath  HEME/LYMPH/IMMUNO:  Normal color without obvious pallor, jaundice, petechiae or bleeding; no obvious cervical chain masses    SKIN:  FULL ORGAN SYSTEM EXAM  Hair, Scalp, Ears, Face Normal except as noted below in Assessment   Neck, Cervical Chain Nodes    Right Arm/Hand/Fingers    Left Arm/Hand/Fingers    Chest/Breasts/Axillae    Abdomen, Umbilicus    Back/Spine    Groin/Genitalia/Buttocks    Right Leg, Foot, Toes    Left Leg, Foot, Toes         Assessment and Plan by Diagnosis:      ISOTRETINOIN "ACCUTANE": MALE    Age:20 y o    Weight: unable to obtain virtual visit   Pulse: unable to obtain virtual visit   Blood Pressure: unable to obtain virtual visit     Ipledge number: [id number]  Last 4 digits SS: 1957      "Goal Dose" in mg (125 mg x weight in kg): 8,388 mg    Interim month   "Daily Dose" of Isotretinoin the patient has actually been taking since last visit (this is usually what was prescribed): 60 mg   "Total # of Days" patient took Isotretinoin since last visit (this is usually 30):  30 days   "Total Monthly Dose" in mg since last visit (this equals "Daily Dose" x "Total # of Days"): 1,800 mg    Cumulative dosage   "Total cumulative Dose" in mg (add the above "Total Monthly Dose" with "Total Cumulative Dose" from last visit: 4050 mg        Symptoms   Conjunctivitis: No   Night Blindness/ Issues with night vision: No   Focusing Problems: No   Dry Lips/Eyes: YES   Dry Skin: YES   Rash: No   Nose Bleeds: No   Angular cheilitis (cracking in corner of lips): No   Headaches: No   Photosensitivity: YES   Dry Anus: No   Depression: No   Mood Changes: No   Suicidal thoughts: No   Fatigue: No   Weight Loss: No   Muscle Pain/Stiffness: No   Abdominal pain: No   Diarrhea: No   Bloody stools: No         Physical Exam   Psychiatric/Mood: normal    Anatomic Location Affected: Face    Morphological Description:  o Open/Closed Comedones:  - No evidence ("Clear")  o Inflammatory Papules/Pustules:  - Few ("Mild")  o Nodules:  - Rare ("Almost Clear")  o Scarring:  - Few ("Mild")  o Excoriations:  - No evidence ("Clear")  o Local Skin Redness/Erythema:  - Few ("Mild")  o Local Skin Dryness/Scaling:  - No evidence ("Clear")  o Local Skin Dyspigmentation:  - No evidence ("Clear")   Pertinent Positives:   Pertinent Negatives:      Labs   Date of last labs:    Completed: YES   Labs reviewed: within normal limits        DIAGNOSES:     Acne Vulgaris   High Risk Medication   Medication Monitoring   Xerosis (see below for patient education)    Assessment and Plan:   Target Total Dose per KILOgram:  125 mg/KILOgram (this may change this on a patient-by-patient basis)   Planned daily dose for next 30 days: 60 mg    Please remember to add patient's "Ipledge number" to actual prescription):     Return to clinic in about 1 month, please review ipledge guidelines in terms of timing (see below for patient education)   Get labs 1-2 days before next appointment: YES   Patient confirmed in iPledge: YES   Patients counseled:  - Cannot donate blood while taking isotretinoin and for 1 month after completing therapy  YES  - Do not share medication with anyone   YES  - Possible side effects discussed, including sun sensitivity, dry lips/eyes/skin, headaches, blurry vision (pseudotumor cerebri), muscle/joint aches, GI upset, bloody stools (IBD including Crohns/Ulcerative Colitis), jaundice, liver dysfunction, lipid abnormalities, bone marrow dysfunction, mood effects, thoughts of hurting oneself or others, and flaring of acne (acne fulminans/SAPPHO)  YES  - Report any side effects of mood swings or depression and stop medication upon occurrence  YES      Isotretinoin for Acne   Isotretinoin is a retinoid medication that is taken by mouth to treat severe nodular acne  Typically, it is used once other acne treatments have not worked, such as oral antibiotics  Usually isotretinoin is taken for 4 to 6 months, although the length of treatment can vary from person to person  While most patients acne improves and may even clear with this medication, in 20% of patients acne can come back  This requires additional acne treatment or even a second cycle of isotretinoin  How should I take isotretinoin?  Isotretinoin dosing is weight-based and should be taken exactly as prescribed   If you miss a dose, skip that dose  Do not take 2 doses at the same time   Take with food to help with absorption   All instructions in the iPLEDGE program packet (www [employer]) that was provided must be followed (see below for highlights)   You will get a 30 day supply of isotretinoin at a time  It cannot be automatically refilled  Make certain that you have been given enough medication to last 30 days as pharmacists are unable to refill or make changes within a month   You must return to your dermatologist every month to make sure you are not having any serious side effects from isotretinoin  For female patients, this visit will always include a monthly pregnancy test  Other laboratory studies, including liver function tests, cholesterol and triglycerides, must also be conducted before and during treatment  What should I avoid while taking isotretinoin?     Do not donate blood while take isotretinoin or until 1 months after coming off the medication   Do not have cosmetic procedures to smooth your skin, including waxing, dermabrasion, or laser procedures, while taking this medication and for at least 6 months after you stop   Do not share isotretinoin with any other people  It can cause birth defects and other serious health problems   Do not use any other acne medications, including medicated washes, cleansers, creams or antibiotic pills during your treatment with isotretinoin unless expressly directed to by your dermatologist      Initiating isotretinoin & the iPLEDGE Program    The iPLEDGE Program is a strict, government-required program to prevent females from becoming pregnant while on isotretinoin  All females and males must participate  Note: Your provider must follow this program and cannot change any of the requirements   Before starting isotretinoin, your provider will talk to you about the safe use of this medication and you will need to sign consent forms in order to receive treatment   If you fail to keep appointments, you will be unable to get your prescription filled   For male patients and women of non-childbearing age: There is no waiting period  Once laboratory tests are done, treatment can start   For more information, visit: [email or web address] [email or web address]    What are the possible side effects of isotretinoin? What should I do? Most side effects from isotretinoin are mild and can be easily improved with simple remedies  Others are more concerning   Dry skin and eyes, chapped lips and dry nose that may lead to nosebleeds  o Dry Skin: Apply sensitive skin moisturizers to dry skin at least two times a day  You may need sunscreen (SPF 30) in the morning and to reapply when outside  o Dry Eyes: Use saline eye drops or artificial tears  o Dry Nose/Nosebleeds: Use saline nasal spray (ex  Ayr) during the day and at bedtime   To stop nosebleeds, hold pressure  If this does not work, call your dermatologist     o Chapped lips: apply petrolatum-based lip balms routinely  Avoid anything medicated   Contact your dermatologist for excessive dryness, cracks, tenderness or pain   Increased blood fats and cholesterol (usually in patients with a personal or family history of cholesterol or triglyceride problems)   Vision problems affecting your ability to see in the dark and drive at night   Bone, muscle and tendon aches can occur  Additional stretching before and after activities may help relieve aches  If you are otherwise healthy, consider the use of ibuprofen or naproxen  If you are unsure if you can use these pain medications, ask your doctor first  Also, call your doctor if you experience severe back pain, joint pain, or a broken bone    Changes in mood, including anxiety, depressive symptoms or suicidal thoughts which may or may not be temporary  Notify your doctor if your or a family member have suffered from these conditions or if you have any concerns during treatment   Serious birth defects or miscarriage can occur while taking this medication and for one month after taking your last dose of isotretinoin  You must not get pregnant while taking isotretinoin  Once the medication is out of your system, 30 days, there is no effect on the baby   Increased pressure in the brain  Call your doctor right away if you experience bad headache, blurred vision, dizziness, nausea or vomiting, or seizures   Skin rash - call your doctor right away if you develop any rashes or blisters on the skin   Liver damage - call your doctor right away if you experience severe stomach, chest or bowel pain, painful swallowing, diarrhea, blood in your stool, yellowing of your skin or eyes, or dark urine   Gastrointestinal bleeding  If you experience unusual abdominal pain or red or black/tarry stools, call your doctor immediately   You should also notify your doctor if you or a family member has a history of ulcerative colitis or Crohns disease   Worsening acne  Mild worsening of acne can occur in the first few weeks of using isotreinoin  If your acne is getting significantly worse, call your doctor  This may require temporarily stopping the isotretinoin and possibly adding other medications  XEROSIS ("DRY SKIN")    Dry skin refers to skin that feels dry to touch  Dry skin has a dull surface with a rough, scaly quality  The skin is less pliable and cracked  When dryness is severe, the skin may become inflamed and fissured  Although any body site can be dry, dry skin tends to affect the shins more than any other site  Dry skin is lacking moisture in the outer horny cell layer (stratum corneum) and this results in cracks in the skin surface  Dry skin is also called xerosis, xeroderma or asteatosis (lack of fat)  It can affect males and females of all ages  There is some racial variability in water and lipid content of the skin   Dry skin that starts in early childhood may be one of about 20 types of ichthyosis (fish-scale skin)  There is often a family history of dry skin   Dry skin is commonly seen in people with atopic dermatitis   Nearly everyone > 60 years has dry skin  Dry skin that begins later may be seen in people with certain diseases and conditions   Postmenopausal women   Hypothyroidism   Chronic renal disease    Malnutrition and weight loss    Subclinical dermatitis    Treatment with certain drugs such as oral retinoids, diuretics and epidermal growth factor receptor inhibitors    People exposed to a dry environment may experience dry skin     Low humidity: in desert climates or cool, windy conditions    Excessive air conditioning    Direct heat from a fire or fan heater    Excessive bathing    Contact with soap, detergents and solvents    Inappropriate topical agents such as alcohol    Frictional irritation from rough clothing or abrasives    Dry skin is due to abnormalities in the integrity of the barrier function of the stratum corneum, which is made up of corneocytes   There is an overall reduction in the lipids in the stratum corneum   Ratio of ceramides, cholesterol and free fatty acids may be normal or altered   There may be a reduction in the proliferation of keratinocytes   Keratinocyte subtypes change in dry skin with a decrease in keratins K1, K10 and increase in K5, K14     Involucrin (a protein) may be expressed early, increasing cell stiffness   The result is retention of corneocytes and reduced water-holding capacity  What is the treatment for dry skin? The mainstay of treatment of dry skin and ichthyosis is moisturisers/emollients  They should be applied liberally and often enough to:   Reduce itch    Improve the barrier function    Prevent entry of irritants, bacteria    Reduce transepidermal water loss  When considering which emollient is most suitable, consider:   Severity of the dryness    Tolerance    Personal preference    Cost and availability  Emollients generally work best if applied to damp skin, if pH is below 7 (acidic), and if containing humectants such as urea or propylene glycol  Additional treatments include:   Topical steroid if itchy or there is dermatitis -- choose an emollient base    Topical calcineurin inhibitors if topical steroids are unsuitable  How can dry skin be prevented? Eliminate aggravating factors:   Reduce the frequency of bathing   A humidifier in winter and air conditioner in summer    Compare having a short shower with a prolonged soak in a bath   Use lukewarm, not hot, water      Replace standard soap with a substitute such as a synthetic detergent cleanser, water-miscible emollient, bath oil, anti-pruritic tar oil, colloidal oatmeal etc     Apply an emollient liberally and often, particularly shortly after bathing, and when itchy  The drier the skin, the thicker this should be, especially on the hands  What is the outlook for dry skin? A tendency to dry skin may persist life-long, or it may improve once contributing factors are controlled    Scribe Attestation    I,:  Kt Rodarte MA am acting as a scribe while in the presence of the attending physician :       I,:  Gi Jones MD personally performed the services described in this documentation    as scribed in my presence :

## 2021-03-17 ENCOUNTER — APPOINTMENT (OUTPATIENT)
Dept: LAB | Facility: MEDICAL CENTER | Age: 21
End: 2021-03-17
Payer: COMMERCIAL

## 2021-03-17 RX ORDER — ISOTRETINOIN 30 MG/1
30 CAPSULE ORAL 2 TIMES DAILY
Qty: 60 CAPSULE | Refills: 0 | Status: SHIPPED | OUTPATIENT
Start: 2021-03-17 | End: 2021-04-15 | Stop reason: SDUPTHER

## 2021-03-18 ENCOUNTER — TELEPHONE (OUTPATIENT)
Dept: DERMATOLOGY | Facility: CLINIC | Age: 21
End: 2021-03-18

## 2021-03-18 NOTE — TELEPHONE ENCOUNTER
----- Message from Tyra Nielsen MD sent at 3/17/2021  8:21 PM EDT -----  Labs reviewed  Within normal limits  Confirmed in Doni Nicolas, please call patient  Isotretinoin 60 mg daily sent to [facility] aid

## 2021-03-18 NOTE — TELEPHONE ENCOUNTER
Called and left message for patient left him know the labs were within normal limits and the accutane 60 mg was sent to the [facility] pharmacy

## 2021-04-15 ENCOUNTER — TELEMEDICINE (OUTPATIENT)
Dept: DERMATOLOGY | Facility: CLINIC | Age: 21
End: 2021-04-15
Payer: COMMERCIAL

## 2021-04-15 DIAGNOSIS — Z79.899 HIGH RISK MEDICATION USE: ICD-10-CM

## 2021-04-15 DIAGNOSIS — L70.0 ACNE VULGARIS: ICD-10-CM

## 2021-04-15 DIAGNOSIS — Z51.81 MEDICATION MONITORING ENCOUNTER: Primary | ICD-10-CM

## 2021-04-15 PROCEDURE — 99213 OFFICE O/P EST LOW 20 MIN: CPT | Performed by: DERMATOLOGY

## 2021-04-15 RX ORDER — ISOTRETINOIN 30 MG/1
30 CAPSULE ORAL 2 TIMES DAILY
Qty: 60 CAPSULE | Refills: 0 | Status: SHIPPED | OUTPATIENT
Start: 2021-04-15 | End: 2021-05-19 | Stop reason: SDUPTHER

## 2021-04-15 NOTE — PROGRESS NOTES
Virtual Regular Visit      Assessment/Plan:    Problem List Items Addressed This Visit        Musculoskeletal and Integument    Acne vulgaris               Reason for visit is   Chief Complaint   Patient presents with    Virtual Regular Visit        Encounter provider Cristino Menjivar MD    Provider located at [address]  [address] Catrina Mcbride 59  [phone]      Recent Visits  No visits were found meeting these conditions  Showing recent visits within past 7 days and meeting all other requirements     Today's Visits  Date Type Provider Dept   04/15/21 Telemedicine Cristino Menjivar MD  Dermatology [facility] today's visits and meeting all other requirements     Future Appointments  No visits were found meeting these conditions  Showing future appointments within next 150 days and meeting all other requirements        The patient was identified by name and date of birth  Lino Klinefelter was informed that this is a telemedicine visit and that the visit is being conducted through [facility] and patient was informed that this is not a secure, HIPAA-compliant platform  He agrees to proceed     My office door was closed  The patient was notified the following individuals were present in the room [facility] and Dr Joshua  He acknowledged consent and understanding of privacy and security of the video platform  The patient has agreed to participate and understands they can discontinue the visit at any time  Patient is aware this is a billable service  Subjective  Lino Klinefelter is a 24 y o  male see derm note below         HPI     Past Medical History:   Diagnosis Date    Acne     Seizure [facility])        Past Surgical History:   Procedure Laterality Date    DENTAL SURGERY         Current Outpatient Medications   Medication Sig Dispense Refill    dexmethylphenidate (FOCALIN XR) 10 MG 24 hr capsule Take 1 cap by mouth in am      dexmethylphenidate (FOCALIN XR) 5 MG 24 hr capsule Take 1 cap by mouth after school      dexmethylphenidate (FOCALIN) 2 5 MG tablet 1 tab after school      ISOtretinoin (ACCUTANE) 30 MG capsule Take 1 capsule (30 mg total) by mouth 2 (two) times a day Ipledge # [id number] 60 capsule 0    sertraline (ZOLOFT) 50 mg tablet Take 50 mg by mouth daily       No current facility-administered medications for this visit  No Known Allergies    Review of Systems    Video Exam    There were no vitals filed for this visit  Physical Exam     I spent 10 minutes directly with the patient during this visit      VIRTUAL VISIT DISCLAIMER    Michael Beard acknowledges that he has consented to an online visit or consultation  He understands that the online visit is based solely on information provided by him, and that, in the absence of a face-to-face physical evaluation by the physician, the diagnosis he receives is both limited and provisional in terms of accuracy and completeness  This is not intended to replace a full medical face-to-face evaluation by the physician  Chucholuis m Beard understands and accepts these terms      [facility] Follow Up Note    Patient Name: Michael Beard  Encounter Date: 04/15/2021    Today's Chief Concerns:  Harry Chavis Concern #1:  Follow up Accutane       Current Medications:    Current Outpatient Medications:     dexmethylphenidate (FOCALIN XR) 10 MG 24 hr capsule, Take 1 cap by mouth in am, Disp: , Rfl:     dexmethylphenidate (FOCALIN XR) 5 MG 24 hr capsule, Take 1 cap by mouth after school, Disp: , Rfl:     dexmethylphenidate (FOCALIN) 2 5 MG tablet, 1 tab after school, Disp: , Rfl:     ISOtretinoin (ACCUTANE) 30 MG capsule, Take 1 capsule (30 mg total) by mouth 2 (two) times a day Ipledge # [id number], Disp: 60 capsule, Rfl: 0    sertraline (ZOLOFT) 50 mg tablet, Take 50 mg by mouth daily, Disp: , Rfl:     CONSTITUTIONAL:   There were no vitals filed for this visit  Have you been seen by a [facility]atologist in the last 3 years? YES    Are you pregnant or planning to become pregnant? N/A    Are you currently or planning to be nursing or breast feeding? N/A    No Known Allergies    May we call your Preferred Phone number to discuss your specific medical information? YES    May we leave a detailed message that includes your specific medical information? YES    Have you traveled outside of the [facility] in the past 3 months? No    Review of Systems:  Have you recently had or currently have any of the following?     · Fever or chills: No  · Night Sweats: No  · Headaches: No  · Weight Gain: No  · Weight Loss: No  · Blurry Vision: No  · Nausea: No  · Vomiting: No  · Diarrhea: No  · Blood in Stool: No  · Abdominal Pain: No  · Itchy Skin: No  · Painful Joints: No  · Swollen Joints: No  · Muscle Pain: No  · Irregular Mole: No  · Sun Burn: No  · Dry Skin: No  · Skin Color Changes: No  · Scar or Keloid: No  · Cold Sores/Fever Blisters: No  · Bacterial Infections/MRSA: No  · Anxiety: No  · Depression: No  · Suicidal or Homicidal Thoughts: No      PSYCH: Normal mood and affect  EYES: Normal conjunctiva  ENT: Normal lips and oral mucosa  CARDIOVASCULAR: No edema  RESPIRATORY: Normal respirations  HEME/LYMPH/IMMUNO:  No regional lymphadenopathy except as noted below in ASSESSMENT AND PLAN BY DIAGNOSIS    FULL ORGAN SYSTEM SKIN EXAM (SKIN)   Hair, Scalp, Ears, Face Normal except as noted below in Assessment   Neck, Cervical Chain Nodes Normal except as noted below in Assessment   Right Arm/Hand/Fingers Normal except as noted below in Assessment   Left Arm/Hand/Fingers Normal except as noted below in Assessment   Chest/Breasts/Axillae Viewed areas Normal except as noted below in Assessment   Abdomen, Umbilicus Normal except as noted below in Assessment   Back/Spine Normal except as noted below in Assessment   Groin/Genitalia/Buttocks Viewed areas Normal except as noted below in Assessment   Right Leg, Foot, Toes Normal except as noted below in Assessment   Left Leg, Foot, Toes Normal except as noted below in Assessment       1  ISOTRETINOIN "ACCUTANE": MALE    Age:20 y o  Ipledge number: [id number]  Last 4 digits SS: 1957      "Goal Dose" in mg (125 mg x weight in kg): 8,388 mg    Interim month   "Daily Dose" of Isotretinoin the patient has actually been taking since last visit (this is usually what was prescribed): 60 mg   "Total # of Days" patient took Isotretinoin since last visit (this is usually 30):  30 days   "Total Monthly Dose" in mg since last visit (this equals "Daily Dose" x "Total # of Days"): 1,800 mg    Cumulative dosage   "Total cumulative Dose" in mg (add the above "Total Monthly Dose" with "Total Cumulative Dose" from last visit: 3,850 mg        Symptoms   Conjunctivitis: No   Night Blindness/ Issues with night vision: No   Focusing Problems: No   Dry Lips/Eyes: YES   Dry Skin: YES   Rash: No   Nose Bleeds: No   Angular cheilitis (cracking in corner of lips): No   Headaches: No   Photosensitivity: YES, improved   Dry Anus: No   Depression: No   Mood Changes: No   Suicidal thoughts: No   Fatigue: No   Weight Loss: No   Muscle Pain/Stiffness: No   Abdominal pain: No   Diarrhea: No   Bloody stools: No         Physical Exam     Anatomic Location Affected:  face   Morphological Description:  o Open/Closed Comedones:  - Rare ("Almost Clear")  o Inflammatory Papules/Pustules:  - Rare ("Almost Clear")  o Nodules:  - Rare ("Almost Clear")  o Scarring:  - No evidence ("Clear")  o Excoriations:  - No evidence ("Clear")  o Local Skin Redness/Erythema:  - Few ("Mild")  o Local Skin Dryness/Scaling:  - Rare ("Almost Clear")  o Local Skin Dyspigmentation:  - Few ("Mild")   Pertinent Positives:   Pertinent Negatives:      Labs   Date of last labs: 03/17/2021   Completed: YES   Labs reviewed: abnormal lipid slightly elevated   Lind Organ Should have full lipid panel post treatment  Comprehensive metabolic panel  Order: [id number]  Status:  Final result   Visible to patient:  Yes ([facility] MyChart)   Next appt:  07/30/2021 at 08:00 AM in Family Medicine Dante Boast, PA-C)   Dx:  Acne vulgaris; High risk medication use   Ref Range & Units 3/17/21 10:49 AM   Sodium 136 - 145 mmol/L 142    Potassium 3 5 - 5 3 mmol/L 4 9    Chloride 100 - 108 mmol/L 109High     CO2 21 - 32 mmol/L 29    ANION GAP 4 - 13 mmol/L 4    BUN 5 - 25 mg/dL 18    Creatinine 0 60 - 1 30 mg/dL 1 11    Comment: Standardized to IDMS reference method   Glucose, Fasting 65 - 99 mg/dL 87    Comment: Specimen collection should occur prior to Sulfasalazine administration due to the potential for falsely depressed results  Specimen collection should occur prior to Sulfapyridine administration due to the potential for falsely elevated results  Calcium 8 3 - 10 1 mg/dL 10  3High     AST 5 - 45 U/L 13    Comment: Specimen collection should occur prior to Sulfasalazine administration due to the potential for falsely depressed results  ALT 12 - 78 U/L 22    Comment: Specimen collection should occur prior to Sulfasalazine and/or Sulfapyridine administration due to the potential for falsely depressed results      Alkaline Phosphatase 46 - 484 U/L 120    Total Protein 6 4 - 8 2 g/dL 8 1    Albumin 3 5 - 5 0 g/dL 4 8    Total Bilirubin 0 20 - 1 00 mg/dL 0 58             Ref Range & Units 3/17/21 10:49 AM   WBC 4 31 - 10 16 Thousand/uL 6 62    RBC 3 88 - 5 62 Million/uL 5 51    Hemoglobin 12 0 - 17 0 g/dL 15 3    Hematocrit 36 5 - 49 3 % 48 1    MCV 82 - 98 fL 87    MCH 26 8 - 34 3 pg 27 8    MCHC 31 4 - 37 4 g/dL 31 8    RDW 11 6 - 15 1 % 12 7    MPV 8 9 - 12 7 fL 11 5    Platelets 778 - 102 Thousands/uL 285    nRBC /100 WBCs 0    Neutrophils Relative 43 - 75 % 44    Immat GRANS % 0 - 2 % 0    Lymphocytes Relative 14 - 44 % 45High     Monocytes Relative 4 - 12 % 8    Eosinophils Relative 0 - 6 % 2    Basophils Relative 0 - 1 % 1    Neutrophils Absolute 1 85 - 7 62 Thousands/µL 2 93    Immature Grans Absolute 0 00 - 0 20 Thousand/uL 0 01    Lymphocytes Absolute 0 60 - 4 47 Thousands/µL 2 99    Monocytes Absolute 0 17 - 1 22 Thousand/µL 0 53    Eosinophils Absolute 0 00 - 0 61 Thousand/µL 0 11    Basophils Absolute 0 00 - 0 10 Thousands/µL 0 05      Cholesterol 50 - 200 mg/dL 257High     Comment: Cholesterol:       Desirable         <200 mg/dl       Borderline         200-239 mg/dl       High              >239          Triglycerides <=150 mg/dL 84    Comment: Triglyceride:      Normal          <150 mg/dl      Borderline High 150-199 mg/dl      High            200-499 mg/dl        Very High       >499 mg/dl          Additional History of Present Condition:  Patient tolerating medication well  DIAGNOSES:     Acne Vulgaris   High Risk Medication   Medication Monitoring   Xerosis (see below for patient education)    Assessment and Plan:   Target Total Dose per KILOgram:  125 mg/KILOgram (this may change this on a patient-by-patient basis)   Planned daily dose for next 30 days: 60 mg    Please remember to add patient's "Ipledge number" to actual prescription):     Return to clinic in about 1 month, please review ipledge guidelines in terms of timing (see below for patient education)   Get labs 1-2 days before next appointment: No   Patient confirmed in iPledge: YES   Patients counseled:  - Cannot donate blood while taking isotretinoin and for 1 month after completing therapy  YES  - Do not share medication with anyone   YES  - Possible side effects discussed, including sun sensitivity, dry lips/eyes/skin, headaches, blurry vision (pseudotumor cerebri), muscle/joint aches, GI upset, bloody stools (IBD including Crohns/Ulcerative Colitis), jaundice, liver dysfunction, lipid abnormalities, bone marrow dysfunction, mood effects, thoughts of hurting oneself or others, and flaring of acne (acne fulminans/SAPPHO)  YES  - Report any side effects of mood swings or depression and stop medication upon occurrence  YES      Isotretinoin for Acne   Isotretinoin is a retinoid medication that is taken by mouth to treat severe nodular acne  Typically, it is used once other acne treatments have not worked, such as oral antibiotics  Usually isotretinoin is taken for 4 to 6 months, although the length of treatment can vary from person to person  While most patients acne improves and may even clear with this medication, in 20% of patients acne can come back  This requires additional acne treatment or even a second cycle of isotretinoin  How should I take isotretinoin?  Isotretinoin dosing is weight-based and should be taken exactly as prescribed   If you miss a dose, skip that dose  Do not take 2 doses at the same time   Take with food to help with absorption   All instructions in the iPLEDGE program packet (www [employer]) that was provided must be followed (see below for highlights)   You will get a 30 day supply of isotretinoin at a time  It cannot be automatically refilled  Make certain that you have been given enough medication to last 30 days as pharmacists are unable to refill or make changes within a month   You must return to your dermatologist every month to make sure you are not having any serious side effects from isotretinoin  For female patients, this visit will always include a monthly pregnancy test  Other laboratory studies, including liver function tests, cholesterol and triglycerides, must also be conducted before and during treatment  What should I avoid while taking isotretinoin?  Do not donate blood while take isotretinoin or until 1 months after coming off the medication   Do not have cosmetic procedures to smooth your skin, including waxing, dermabrasion, or laser procedures, while taking this medication and for at least 6 months after you stop       Do not share isotretinoin with any other people  It can cause birth defects and other serious health problems   Do not use any other acne medications, including medicated washes, cleansers, creams or antibiotic pills during your treatment with isotretinoin unless expressly directed to by your dermatologist      Initiating isotretinoin & the iPLEDGE Program    The iPLEDGE Program is a strict, government-required program to prevent females from becoming pregnant while on isotretinoin  All females and males must participate  Note: Your provider must follow this program and cannot change any of the requirements   Before starting isotretinoin, your provider will talk to you about the safe use of this medication and you will need to sign consent forms in order to receive treatment   If you fail to keep appointments, you will be unable to get your prescription filled   For male patients and women of non-childbearing age: There is no waiting period  Once laboratory tests are done, treatment can start   For more information, visit: [email or web address]    What are the possible side effects of isotretinoin? What should I do? Most side effects from isotretinoin are mild and can be easily improved with simple remedies  Others are more concerning   Dry skin and eyes, chapped lips and dry nose that may lead to nosebleeds  o Dry Skin: Apply sensitive skin moisturizers to dry skin at least two times a day  You may need sunscreen (SPF 30) in the morning and to reapply when outside  o Dry Eyes: Use saline eye drops or artificial tears  o Dry Nose/Nosebleeds: Use saline nasal spray (ex  Ayr) during the day and at bedtime  To stop nosebleeds, hold pressure  If this does not work, call your dermatologist     o Chapped lips: apply petrolatum-based lip balms routinely  Avoid anything medicated   Contact your dermatologist for excessive dryness, cracks, tenderness or pain   Increased blood fats and cholesterol (usually in patients with a personal or family history of cholesterol or triglyceride problems)   Vision problems affecting your ability to see in the dark and drive at night   Bone, muscle and tendon aches can occur  Additional stretching before and after activities may help relieve aches  If you are otherwise healthy, consider the use of ibuprofen or naproxen  If you are unsure if you can use these pain medications, ask your doctor first  Also, call your doctor if you experience severe back pain, joint pain, or a broken bone    Changes in mood, including anxiety, depressive symptoms or suicidal thoughts which may or may not be temporary  Notify your doctor if your or a family member have suffered from these conditions or if you have any concerns during treatment   Serious birth defects or miscarriage can occur while taking this medication and for one month after taking your last dose of isotretinoin  You must not get pregnant while taking isotretinoin  Once the medication is out of your system, 30 days, there is no effect on the baby   Increased pressure in the brain  Call your doctor right away if you experience bad headache, blurred vision, dizziness, nausea or vomiting, or seizures   Skin rash - call your doctor right away if you develop any rashes or blisters on the skin   Liver damage - call your doctor right away if you experience severe stomach, chest or bowel pain, painful swallowing, diarrhea, blood in your stool, yellowing of your skin or eyes, or dark urine   Gastrointestinal bleeding  If you experience unusual abdominal pain or red or black/tarry stools, call your doctor immediately  You should also notify your doctor if you or a family member has a history of ulcerative colitis or Crohns disease   Worsening acne  Mild worsening of acne can occur in the first few weeks of using isotreinoin   If your acne is getting significantly worse, call your doctor  This may require temporarily stopping the isotretinoin and possibly adding other medications  XEROSIS ("DRY SKIN")    Dry skin refers to skin that feels dry to touch  Dry skin has a dull surface with a rough, scaly quality  The skin is less pliable and cracked  When dryness is severe, the skin may become inflamed and fissured  Although any body site can be dry, dry skin tends to affect the shins more than any other site  Dry skin is lacking moisture in the outer horny cell layer (stratum corneum) and this results in cracks in the skin surface  Dry skin is also called xerosis, xeroderma or asteatosis (lack of fat)  It can affect males and females of all ages  There is some racial variability in water and lipid content of the skin   Dry skin that starts in early childhood may be one of about 20 types of ichthyosis (fish-scale skin)  There is often a family history of dry skin   Dry skin is commonly seen in people with atopic dermatitis   Nearly everyone > 60 years has dry skin  Dry skin that begins later may be seen in people with certain diseases and conditions   Postmenopausal women   Hypothyroidism   Chronic renal disease    Malnutrition and weight loss    Subclinical dermatitis    Treatment with certain drugs such as oral retinoids, diuretics and epidermal growth factor receptor inhibitors    People exposed to a dry environment may experience dry skin   Low humidity: in desert climates or cool, windy conditions    Excessive air conditioning    Direct heat from a fire or fan heater    Excessive bathing    Contact with soap, detergents and solvents    Inappropriate topical agents such as alcohol    Frictional irritation from rough clothing or abrasives    Dry skin is due to abnormalities in the integrity of the barrier function of the stratum corneum, which is made up of corneocytes     There is an overall reduction in the lipids in the stratum corneum   Ratio of ceramides, cholesterol and free fatty acids may be normal or altered   There may be a reduction in the proliferation of keratinocytes   Keratinocyte subtypes change in dry skin with a decrease in keratins K1, K10 and increase in K5, K14     Involucrin (a protein) may be expressed early, increasing cell stiffness   The result is retention of corneocytes and reduced water-holding capacity  What is the treatment for dry skin? The mainstay of treatment of dry skin and ichthyosis is moisturisers/emollients  They should be applied liberally and often enough to:   Reduce itch    Improve the barrier function    Prevent entry of irritants, bacteria    Reduce transepidermal water loss  When considering which emollient is most suitable, consider:   Severity of the dryness    Tolerance    Personal preference    Cost and availability  Emollients generally work best if applied to damp skin, if pH is below 7 (acidic), and if containing humectants such as urea or propylene glycol  Additional treatments include:   Topical steroid if itchy or there is dermatitis -- choose an emollient base    Topical calcineurin inhibitors if topical steroids are unsuitable  How can dry skin be prevented? Eliminate aggravating factors:   Reduce the frequency of bathing   A humidifier in winter and air conditioner in summer    Compare having a short shower with a prolonged soak in a bath   Use lukewarm, not hot, water   Replace standard soap with a substitute such as a synthetic detergent cleanser, water-miscible emollient, bath oil, anti-pruritic tar oil, colloidal oatmeal etc     Apply an emollient liberally and often, particularly shortly after bathing, and when itchy  The drier the skin, the thicker this should be, especially on the hands  What is the outlook for dry skin?   A tendency to dry skin may persist life-long, or it may improve once contributing factors are controlled      Scribe Attestation    I,:  [employer] am acting as a scribe while in the presence of the attending physician :       I,:  Anabell Cook MD personally performed the services described in this documentation    as scribed in my presence :

## 2021-04-19 DIAGNOSIS — L70.0 ACNE VULGARIS: ICD-10-CM

## 2021-04-20 RX ORDER — DOXYCYCLINE HYCLATE 100 MG/1
CAPSULE ORAL
Qty: 60 CAPSULE | Refills: 0 | OUTPATIENT
Start: 2021-04-20

## 2021-05-19 ENCOUNTER — TELEMEDICINE (OUTPATIENT)
Dept: DERMATOLOGY | Facility: CLINIC | Age: 21
End: 2021-05-19
Payer: COMMERCIAL

## 2021-05-19 DIAGNOSIS — L70.0 ACNE VULGARIS: Primary | ICD-10-CM

## 2021-05-19 DIAGNOSIS — Z79.899 HIGH RISK MEDICATION USE: ICD-10-CM

## 2021-05-19 PROCEDURE — 1036F TOBACCO NON-USER: CPT | Performed by: DERMATOLOGY

## 2021-05-19 PROCEDURE — 99214 OFFICE O/P EST MOD 30 MIN: CPT | Performed by: DERMATOLOGY

## 2021-05-19 RX ORDER — ISOTRETINOIN 30 MG/1
30 CAPSULE ORAL 2 TIMES DAILY
Qty: 60 CAPSULE | Refills: 0 | Status: SHIPPED | OUTPATIENT
Start: 2021-05-19 | End: 2021-06-17 | Stop reason: SDUPTHER

## 2021-05-19 NOTE — PROGRESS NOTES
[facility] Follow Up Note    Patient Name: Miracle Molina  Encounter Date: 05/19/21    Today's Chief Concerns:  Sundar [place] Concern #1: Acne Vulgaris      Current Medications:    Current Outpatient Medications:     dexmethylphenidate (FOCALIN XR) 10 MG 24 hr capsule, Take 1 cap by mouth in am, Disp: , Rfl:     dexmethylphenidate (FOCALIN XR) 5 MG 24 hr capsule, Take 1 cap by mouth after school, Disp: , Rfl:     dexmethylphenidate (FOCALIN) 2 5 MG tablet, 1 tab after school, Disp: , Rfl:     ISOtretinoin (ACCUTANE) 30 MG capsule, Take 1 capsule (30 mg total) by mouth 2 (two) times a day Ipledge # [id number], Disp: 60 capsule, Rfl: 0    sertraline (ZOLOFT) 50 mg tablet, Take 50 mg by mouth daily, Disp: , Rfl:     CONSTITUTIONAL:   There were no vitals filed for this visit  Specific Alerts:    Have you been seen by a [facility] Dermatologist in the last 3 years? YES    Are you pregnant or planning to become pregnant? N/A    Are you currently or planning to be nursing or breast feeding? N/A    No Known Allergies    May we call your Preferred Phone number to discuss your specific medical information? YES    May we leave a detailed message that includes your specific medical information? YES     Have you traveled outside of the [facility] in the past 3 months? No    Do you currently have a pacemaker or defibrillator? No    Do you have any artificial heart valves, joints, plates, screws, rods, stents, pins, etc? No   - If Yes, were any placed within the last 2 years? Do you require any medications prior to a surgical procedure? No      Are you taking any medications that cause you to bleed more easily ("blood thinners") No    Have you ever experienced a rapid heartbeat with epinephrine? No    Have you ever been treated with "gold" (gold sodium thiomalate) therapy?      [address] Dermatology can help with wrinkles, "laugh lines," facial volume loss, "double chin," "love handles," age spots, and more  Are you interested in learning today about some of the skin enhancement procedures that we offer? (If Yes, please provide more detail)     Review of Systems:  Have you recently had or currently have any of the following? · Fever or chills: No  · Night Sweats: No  · Headaches: No  · Weight Gain: No  · Weight Loss: No  · Blurry Vision: No  · Nausea: No  · Vomiting: No  · Diarrhea: No  · Blood in Stool: No  · Abdominal Pain: No  · Itchy Skin: No  · Painful Joints: No  · Swollen Joints: No  · Muscle Pain: No  · Irregular Mole: No  · Sun Burn: No  · Dry Skin: YES  · Skin Color Changes: No  · Scar or Keloid: No  · Cold Sores/Fever Blisters: No  · Bacterial Infections/MRSA: No  · Anxiety: No  · Depression: No  · Suicidal or Homicidal Thoughts: No    PSYCH: Normal mood and affect  EYES: Normal conjunctiva  ENT: Normal lips and oral mucosa  CARDIOVASCULAR: No edema  RESPIRATORY: Normal respirations  HEME/LYMPH/IMMUNO:  No regional lymphadenopathy except as noted below in ASSESSMENT AND PLAN BY DIAGNOSIS    FULL ORGAN SYSTEM SKIN EXAM (SKIN)  Hair, Scalp, Ears, Face Normal except as noted below in Assessment   Neck, Cervical Chain Nodes Normal except as noted below in Assessment                                         ISOTRETINOIN "ACCUTANE": MALE    Age:20 y o        Ipledge number: [id number]  Last 4 digits SS: 1957      "Goal Dose" in mg (125 mg x weight in kg): 8,388 mg    Interim month   "Daily Dose" of Isotretinoin the patient has actually been taking since last visit (this is usually what was prescribed):  60mg   "Total # of Days" patient took Isotretinoin since last visit (this is usually 30):  30 days   "Total Monthly Dose" in mg since last visit (this equals "Daily Dose" x "Total # of Days"): 1,800 mg    Cumulative dosage   "Total cumulative Dose" in mg (add the above "Total Monthly Dose" with "Total Cumulative Dose" from last visit: 7600 mg        Symptoms   Conjunctivitis: No   Night Blindness/ Issues with night vision: No   Focusing Problems: No   Dry Lips/Eyes: YES   Dry Skin: YES   Rash: No   Nose Bleeds: No   Angular cheilitis (cracking in corner of lips): No   Headaches: No   Photosensitivity: yes   Dry Anus: No   Depression: No   Mood Changes: No   Suicidal thoughts: No   Fatigue: No   Weight Loss: No   Muscle Pain/Stiffness: No   Abdominal pain: No   Diarrhea: No   Bloody stools: No         Physical Exam   Psychiatric/Mood: normal   Anatomic Location Affected:  Back, shoulders   Morphological Description:  o Open/Closed Comedones:  - No evidence ("Clear")  o Inflammatory Papules/Pustules:  - Rare ("Almost Clear")  o Nodules:  - No evidence ("Clear")  o Scarring:  - Few ("Mild")  o Excoriations:  - No evidence ("Clear")  o Local Skin Redness/Erythema:  - Few ("Mild")  o Local Skin Dryness/Scaling:  - No evidence ("Clear")  o Local Skin Dyspigmentation:  - Few ("Mild")   Pertinent Positives:   Pertinent Negatives: Additional History of Present Condition:  Patient tolerating medication well       DIAGNOSES:     Acne Vulgaris   High Risk Medication   Medication Monitoring   Xerosis (see below for patient education)    Assessment and Plan:   Target Total Dose per KILOgram:  125 mg/KILOgram (this may change this on a patient-by-patient basis)   Planned daily dose for next 30 days: 60 mg    Please remember to add patient's "Ipledge number" to actual prescription):     Return to clinic in about 1 month, please review ipledge guidelines in terms of timing (see below for patient education)   Get labs 1-2 days before next appointment: no   Patient confirmed in iPledge: YES   Patients counseled:  - Cannot donate blood while taking isotretinoin and for 1 month after completing therapy  YES  - Do not share medication with anyone   YES  - Possible side effects discussed, including sun sensitivity, dry lips/eyes/skin, headaches, blurry vision (pseudotumor cerebri), muscle/joint aches, GI upset, bloody stools (IBD including Crohns/Ulcerative Colitis), jaundice, liver dysfunction, lipid abnormalities, bone marrow dysfunction, mood effects, thoughts of hurting oneself or others, and flaring of acne (acne fulminans/SAPPHO)  YES  - Report any side effects of mood swings or depression and stop medication upon occurrence  YES      Isotretinoin for Acne   Isotretinoin is a retinoid medication that is taken by mouth to treat severe nodular acne  Typically, it is used once other acne treatments have not worked, such as oral antibiotics  Usually isotretinoin is taken for 4 to 6 months, although the length of treatment can vary from person to person  While most patients acne improves and may even clear with this medication, in 20% of patients acne can come back  This requires additional acne treatment or even a second cycle of isotretinoin  How should I take isotretinoin?  Isotretinoin dosing is weight-based and should be taken exactly as prescribed   If you miss a dose, skip that dose  Do not take 2 doses at the same time   Take with food to help with absorption   All instructions in the iPLEDGE program packet (www [employer]) that was provided must be followed (see below for highlights)   You will get a 30 day supply of isotretinoin at a time  It cannot be automatically refilled  Make certain that you have been given enough medication to last 30 days as pharmacists are unable to refill or make changes within a month   You must return to your dermatologist every month to make sure you are not having any serious side effects from isotretinoin  For female patients, this visit will always include a monthly pregnancy test  Other laboratory studies, including liver function tests, cholesterol and triglycerides, must also be conducted before and during treatment  What should I avoid while taking isotretinoin?     Do not donate blood while take isotretinoin or until 1 months after coming off the medication   Do not have cosmetic procedures to smooth your skin, including waxing, dermabrasion, or laser procedures, while taking this medication and for at least 6 months after you stop   Do not share isotretinoin with any other people  It can cause birth defects and other serious health problems   Do not use any other acne medications, including medicated washes, cleansers, creams or antibiotic pills during your treatment with isotretinoin unless expressly directed to by your dermatologist      Initiating isotretinoin & the iPLEDGE Program    The iPLEDGE Program is a strict, government-required program to prevent females from becoming pregnant while on isotretinoin  All females and males must participate  Note: Your provider must follow this program and cannot change any of the requirements   Before starting isotretinoin, your provider will talk to you about the safe use of this medication and you will need to sign consent forms in order to receive treatment   If you fail to keep appointments, you will be unable to get your prescription filled   For male patients and women of non-childbearing age: There is no waiting period  Once laboratory tests are done, treatment can start   For more information, visit: [email or web address]    What are the possible side effects of isotretinoin? What should I do? Most side effects from isotretinoin are mild and can be easily improved with simple remedies  Others are more concerning   Dry skin and eyes, chapped lips and dry nose that may lead to nosebleeds  o Dry Skin: Apply sensitive skin moisturizers to dry skin at least two times a day  You may need sunscreen (SPF 30) in the morning and to reapply when outside  o Dry Eyes: Use saline eye drops or artificial tears  o Dry Nose/Nosebleeds: Use saline nasal spray (ex  Ayr) during the day and at bedtime  To stop nosebleeds, hold pressure  If this does not work, call your dermatologist     o Chapped lips: apply petrolatum-based lip balms routinely  Avoid anything medicated   Contact your dermatologist for excessive dryness, cracks, tenderness or pain   Increased blood fats and cholesterol (usually in patients with a personal or family history of cholesterol or triglyceride problems)   Vision problems affecting your ability to see in the dark and drive at night   Bone, muscle and tendon aches can occur  Additional stretching before and after activities may help relieve aches  If you are otherwise healthy, consider the use of ibuprofen or naproxen  If you are unsure if you can use these pain medications, ask your doctor first  Also, call your doctor if you experience severe back pain, joint pain, or a broken bone    Changes in mood, including anxiety, depressive symptoms or suicidal thoughts which may or may not be temporary  Notify your doctor if your or a family member have suffered from these conditions or if you have any concerns during treatment   Serious birth defects or miscarriage can occur while taking this medication and for one month after taking your last dose of isotretinoin  You must not get pregnant while taking isotretinoin  Once the medication is out of your system, 30 days, there is no effect on the baby   Increased pressure in the brain  Call your doctor right away if you experience bad headache, blurred vision, dizziness, nausea or vomiting, or seizures   Skin rash - call your doctor right away if you develop any rashes or blisters on the skin   Liver damage - call your doctor right away if you experience severe stomach, chest or bowel pain, painful swallowing, diarrhea, blood in your stool, yellowing of your skin or eyes, or dark urine   Gastrointestinal bleeding  If you experience unusual abdominal pain or red or black/tarry stools, call your doctor immediately   You should also notify your doctor if you or a family member has a history of ulcerative colitis or Crohns disease   Worsening acne  Mild worsening of acne can occur in the first few weeks of using isotreinoin  If your acne is getting significantly worse, call your doctor  This may require temporarily stopping the isotretinoin and possibly adding other medications  XEROSIS ("DRY SKIN")    Dry skin refers to skin that feels dry to touch  Dry skin has a dull surface with a rough, scaly quality  The skin is less pliable and cracked  When dryness is severe, the skin may become inflamed and fissured  Although any body site can be dry, dry skin tends to affect the shins more than any other site  Dry skin is lacking moisture in the outer horny cell layer (stratum corneum) and this results in cracks in the skin surface  Dry skin is also called xerosis, xeroderma or asteatosis (lack of fat)  It can affect males and females of all ages  There is some racial variability in water and lipid content of the skin   Dry skin that starts in early childhood may be one of about 20 types of ichthyosis (fish-scale skin)  There is often a family history of dry skin   Dry skin is commonly seen in people with atopic dermatitis   Nearly everyone > 60 years has dry skin  Dry skin that begins later may be seen in people with certain diseases and conditions   Postmenopausal women   Hypothyroidism   Chronic renal disease    Malnutrition and weight loss    Subclinical dermatitis    Treatment with certain drugs such as oral retinoids, diuretics and epidermal growth factor receptor inhibitors    People exposed to a dry environment may experience dry skin     Low humidity: in desert climates or cool, windy conditions    Excessive air conditioning    Direct heat from a fire or fan heater    Excessive bathing    Contact with soap, detergents and solvents    Inappropriate topical agents such as alcohol  Frictional irritation from rough clothing or abrasives    Dry skin is due to abnormalities in the integrity of the barrier function of the stratum corneum, which is made up of corneocytes   There is an overall reduction in the lipids in the stratum corneum   Ratio of ceramides, cholesterol and free fatty acids may be normal or altered   There may be a reduction in the proliferation of keratinocytes   Keratinocyte subtypes change in dry skin with a decrease in keratins K1, K10 and increase in K5, K14     Involucrin (a protein) may be expressed early, increasing cell stiffness   The result is retention of corneocytes and reduced water-holding capacity  What is the treatment for dry skin? The mainstay of treatment of dry skin and ichthyosis is moisturisers/emollients  They should be applied liberally and often enough to:   Reduce itch    Improve the barrier function    Prevent entry of irritants, bacteria    Reduce transepidermal water loss  When considering which emollient is most suitable, consider:   Severity of the dryness    Tolerance    Personal preference    Cost and availability  Emollients generally work best if applied to damp skin, if pH is below 7 (acidic), and if containing humectants such as urea or propylene glycol  Additional treatments include:   Topical steroid if itchy or there is dermatitis -- choose an emollient base    Topical calcineurin inhibitors if topical steroids are unsuitable  How can dry skin be prevented? Eliminate aggravating factors:   Reduce the frequency of bathing   A humidifier in winter and air conditioner in summer    Compare having a short shower with a prolonged soak in a bath   Use lukewarm, not hot, water      Replace standard soap with a substitute such as a synthetic detergent cleanser, water-miscible emollient, bath oil, anti-pruritic tar oil, colloidal oatmeal etc     Apply an emollient liberally and often, particularly shortly after bathing, and when itchy  The drier the skin, the thicker this should be, especially on the hands  What is the outlook for dry skin? A tendency to dry skin may persist life-long, or it may improve once contributing factors are controlled  Scribe Attestation    I,:  Florentin Kamara am acting as a scribe while in the presence of the attending physician :       I,:  Vishnu Day MD personally performed the services described in this documentation    as scribed in my presence  :             Virtual Regular Visit      Assessment/Plan:    Problem List Items Addressed This Visit        Musculoskeletal and Integument    Acne vulgaris - Primary    Relevant Medications    ISOtretinoin (ACCUTANE) 30 MG capsule      Other Visit Diagnoses     High risk medication use                   Reason for visit is   Chief Complaint   Patient presents with    Virtual Regular Visit        Encounter provider Vishnu Day MD    Provider located at [address] Catrina Mcbride   [phone]      Recent Visits  Date Type Provider Dept   05/19/21 Telemedicine Vishnu Day MD  Dermatology [facility] recent visits within past 7 days and meeting all other requirements     Future Appointments  No visits were found meeting these conditions  Showing future appointments within next 150 days and meeting all other requirements        The patient was identified by name and date of birth  Gauravbernadine Mikey was informed that this is a telemedicine visit and that the visit is being conducted through [facility] and patient was informed that this is not a secure, HIPAA-compliant platform  He agrees to proceed     My office door was closed  The patient was notified the following individuals were present in the room [facility]  He acknowledged consent and understanding of privacy and security of the video platform   The patient has agreed to participate and understands they can discontinue the visit at any time  Patient is aware this is a billable service  Billy Shoemaker is a 24 y o  male    HPI     Past Medical History:   Diagnosis Date    Acne     Seizure [facility])        Past Surgical History:   Procedure Laterality Date    DENTAL SURGERY         Current Outpatient Medications   Medication Sig Dispense Refill    dexmethylphenidate (FOCALIN XR) 10 MG 24 hr capsule Take 1 cap by mouth in am      dexmethylphenidate (FOCALIN XR) 5 MG 24 hr capsule Take 1 cap by mouth after school      dexmethylphenidate (FOCALIN) 2 5 MG tablet 1 tab after school      ISOtretinoin (ACCUTANE) 30 MG capsule Take 1 capsule (30 mg total) by mouth 2 (two) times a day Ipledge # [id number] 60 capsule 0    sertraline (ZOLOFT) 50 mg tablet Take 50 mg by mouth daily       No current facility-administered medications for this visit  No Known Allergies    Review of Systems    Video Exam    There were no vitals filed for this visit  Physical Exam     I spent 10 minutes directly with the patient during this visit      VIRTUAL VISIT DISCLAIMER    Josette Hays acknowledges that he has consented to an online visit or consultation  He understands that the online visit is based solely on information provided by him, and that, in the absence of a face-to-face physical evaluation by the physician, the diagnosis he receives is both limited and provisional in terms of accuracy and completeness  This is not intended to replace a full medical face-to-face evaluation by the physician  Josette Hays understands and accepts these terms

## 2021-06-17 ENCOUNTER — TELEMEDICINE (OUTPATIENT)
Dept: DERMATOLOGY | Facility: CLINIC | Age: 21
End: 2021-06-17
Payer: COMMERCIAL

## 2021-06-17 DIAGNOSIS — L70.0 ACNE VULGARIS: ICD-10-CM

## 2021-06-17 PROCEDURE — 99214 OFFICE O/P EST MOD 30 MIN: CPT | Performed by: DERMATOLOGY

## 2021-06-17 RX ORDER — ISOTRETINOIN 30 MG/1
30 CAPSULE ORAL 2 TIMES DAILY
Qty: 60 CAPSULE | Refills: 0 | Status: SHIPPED | OUTPATIENT
Start: 2021-06-17 | End: 2021-07-12 | Stop reason: SDUPTHER

## 2021-06-17 NOTE — PATIENT INSTRUCTIONS
Assessment and Plan:   Target Total Dose per KILOgram:  125 mg/KILOgram (this may change this on a patient-by-patient basis)   Planned daily dose for next 30 days: 60 mg    Please remember to add patient's "Ipledge number" to actual prescription):     Return to clinic in about 1 month, please review ipledge guidelines in terms of timing (see below for patient education)   Get labs 1-2 days before next appointment: YES   Patient confirmed in iPledge: YES   Patients counseled:  - Cannot donate blood while taking isotretinoin and for 1 month after completing therapy  YES  - Do not share medication with anyone  YES  - Possible side effects discussed, including sun sensitivity, dry lips/eyes/skin, headaches, blurry vision (pseudotumor cerebri), muscle/joint aches, GI upset, bloody stools (IBD including Crohns/Ulcerative Colitis), jaundice, liver dysfunction, lipid abnormalities, bone marrow dysfunction, mood effects, thoughts of hurting oneself or others, and flaring of acne (acne fulminans/SAPPHO)  YES  - Report any side effects of mood swings or depression and stop medication upon occurrence  YES      Isotretinoin for Acne   Isotretinoin is a retinoid medication that is taken by mouth to treat severe nodular acne  Typically, it is used once other acne treatments have not worked, such as oral antibiotics  Usually isotretinoin is taken for 4 to 6 months, although the length of treatment can vary from person to person  While most patients acne improves and may even clear with this medication, in 20% of patients acne can come back  This requires additional acne treatment or even a second cycle of isotretinoin  How should I take isotretinoin?  Isotretinoin dosing is weight-based and should be taken exactly as prescribed   If you miss a dose, skip that dose  Do not take 2 doses at the same time   Take with food to help with absorption     All instructions in the [facility] program packet (www [employer]) that was provided must be followed (see below for highlights)   You will get a 30 day supply of isotretinoin at a time  It cannot be automatically refilled  Make certain that you have been given enough medication to last 30 days as pharmacists are unable to refill or make changes within a month   You must return to your dermatologist every month to make sure you are not having any serious side effects from isotretinoin  For female patients, this visit will always include a monthly pregnancy test  Other laboratory studies, including liver function tests, cholesterol and triglycerides, must also be conducted before and during treatment  What should I avoid while taking isotretinoin?  Do not donate blood while take isotretinoin or until 1 months after coming off the medication   Do not have cosmetic procedures to smooth your skin, including waxing, dermabrasion, or laser procedures, while taking this medication and for at least 6 months after you stop   Do not share isotretinoin with any other people  It can cause birth defects and other serious health problems   Do not use any other acne medications, including medicated washes, cleansers, creams or antibiotic pills during your treatment with isotretinoin unless expressly directed to by your dermatologist      Initiating isotretinoin & the iPLEDGE Program    The iPLEDGE Program is a strict, government-required program to prevent females from becoming pregnant while on isotretinoin  All females and males must participate  Note: Your provider must follow this program and cannot change any of the requirements   Before starting isotretinoin, your provider will talk to you about the safe use of this medication and you will need to sign consent forms in order to receive treatment   If you fail to keep appointments, you will be unable to get your prescription filled        For male patients and women of non-childbearing age: There is no waiting period  Once laboratory tests are done, treatment can start   For more information, visit: [email or web address]    What are the possible side effects of isotretinoin? What should I do? Most side effects from isotretinoin are mild and can be easily improved with simple remedies  Others are more concerning   Dry skin and eyes, chapped lips and dry nose that may lead to nosebleeds  o Dry Skin: Apply sensitive skin moisturizers to dry skin at least two times a day  You may need sunscreen (SPF 30) in the morning and to reapply when outside  o Dry Eyes: Use saline eye drops or artificial tears  o Dry Nose/Nosebleeds: Use saline nasal spray (ex  Ayr) during the day and at bedtime  To stop nosebleeds, hold pressure  If this does not work, call your dermatologist     o Chapped lips: apply petrolatum-based lip balms routinely  Avoid anything medicated   Contact your dermatologist for excessive dryness, cracks, tenderness or pain   Increased blood fats and cholesterol (usually in patients with a personal or family history of cholesterol or triglyceride problems)   Vision problems affecting your ability to see in the dark and drive at night   Bone, muscle and tendon aches can occur  Additional stretching before and after activities may help relieve aches  If you are otherwise healthy, consider the use of ibuprofen or naproxen  If you are unsure if you can use these pain medications, ask your doctor first  Also, call your doctor if you experience severe back pain, joint pain, or a broken bone    Changes in mood, including anxiety, depressive symptoms or suicidal thoughts which may or may not be temporary  Notify your doctor if your or a family member have suffered from these conditions or if you have any concerns during treatment      Serious birth defects or miscarriage can occur while taking this medication and for one month after taking your last dose of isotretinoin  You must not get pregnant while taking isotretinoin  Once the medication is out of your system, 30 days, there is no effect on the baby   Increased pressure in the brain  Call your doctor right away if you experience bad headache, blurred vision, dizziness, nausea or vomiting, or seizures   Skin rash - call your doctor right away if you develop any rashes or blisters on the skin   Liver damage - call your doctor right away if you experience severe stomach, chest or bowel pain, painful swallowing, diarrhea, blood in your stool, yellowing of your skin or eyes, or dark urine   Gastrointestinal bleeding  If you experience unusual abdominal pain or red or black/tarry stools, call your doctor immediately  You should also notify your doctor if you or a family member has a history of ulcerative colitis or Crohns disease   Worsening acne  Mild worsening of acne can occur in the first few weeks of using isotreinoin  If your acne is getting significantly worse, call your doctor  This may require temporarily stopping the isotretinoin and possibly adding other medications  XEROSIS ("DRY SKIN")    Dry skin refers to skin that feels dry to touch  Dry skin has a dull surface with a rough, scaly quality  The skin is less pliable and cracked  When dryness is severe, the skin may become inflamed and fissured  Although any body site can be dry, dry skin tends to affect the shins more than any other site  Dry skin is lacking moisture in the outer horny cell layer (stratum corneum) and this results in cracks in the skin surface  Dry skin is also called xerosis, xeroderma or asteatosis (lack of fat)  It can affect males and females of all ages  There is some racial variability in water and lipid content of the skin   Dry skin that starts in early childhood may be one of about 20 types of ichthyosis (fish-scale skin)  There is often a family history of dry skin   Dry skin is commonly seen in people with atopic dermatitis   Nearly everyone > 60 years has dry skin  Dry skin that begins later may be seen in people with certain diseases and conditions   Postmenopausal women   Hypothyroidism   Chronic renal disease    Malnutrition and weight loss    Subclinical dermatitis    Treatment with certain drugs such as oral retinoids, diuretics and epidermal growth factor receptor inhibitors    People exposed to a dry environment may experience dry skin   Low humidity: in desert climates or cool, windy conditions    Excessive air conditioning    Direct heat from a fire or fan heater    Excessive bathing    Contact with soap, detergents and solvents    Inappropriate topical agents such as alcohol    Frictional irritation from rough clothing or abrasives    Dry skin is due to abnormalities in the integrity of the barrier function of the stratum corneum, which is made up of corneocytes   There is an overall reduction in the lipids in the stratum corneum   Ratio of ceramides, cholesterol and free fatty acids may be normal or altered   There may be a reduction in the proliferation of keratinocytes   Keratinocyte subtypes change in dry skin with a decrease in keratins K1, K10 and increase in K5, K14     Involucrin (a protein) may be expressed early, increasing cell stiffness   The result is retention of corneocytes and reduced water-holding capacity  What is the treatment for dry skin? The mainstay of treatment of dry skin and ichthyosis is moisturisers/emollients  They should be applied liberally and often enough to:   Reduce itch    Improve the barrier function    Prevent entry of irritants, bacteria    Reduce transepidermal water loss  When considering which emollient is most suitable, consider:   Severity of the dryness    Tolerance    Personal preference    Cost and availability      Emollients generally work best if applied to damp skin, if pH is below 7 (acidic), and if containing humectants such as urea or propylene glycol  Additional treatments include:   Topical steroid if itchy or there is dermatitis -- choose an emollient base    Topical calcineurin inhibitors if topical steroids are unsuitable  How can dry skin be prevented? Eliminate aggravating factors:   Reduce the frequency of bathing   A humidifier in winter and air conditioner in summer    Compare having a short shower with a prolonged soak in a bath   Use lukewarm, not hot, water   Replace standard soap with a substitute such as a synthetic detergent cleanser, water-miscible emollient, bath oil, anti-pruritic tar oil, colloidal oatmeal etc     Apply an emollient liberally and often, particularly shortly after bathing, and when itchy  The drier the skin, the thicker this should be, especially on the hands  What is the outlook for dry skin? A tendency to dry skin may persist life-long, or it may improve once contributing factors are controlled

## 2021-06-17 NOTE — PROGRESS NOTES
Virtual Regular Visit      Assessment/Plan:    Problem List Items Addressed This Visit     None               Reason for visit is accutane follow up   Chief Complaint   Patient presents with    Virtual Regular Visit        Encounter provider Tamia Pugh MD    Provider located at [address] [place] [address]  [facility]  [phone]      Recent Visits  No visits were found meeting these conditions  Showing recent visits within past 7 days and meeting all other requirements  Today's Visits  Date Type Provider Dept   06/17/21 Telemedicine Tamia Pugh MD  [facility] [place]   Showing today's visits and meeting all other requirements  Future Appointments  No visits were found meeting these conditions  Showing future appointments within next 150 days and meeting all other requirements       The patient was identified by name and date of birth  Rc Sanchez was informed that this is a telemedicine visit and that the visit is being conducted through [employer] and patient was informed that this is not a secure, HIPAA-compliant platform  He agrees to proceed     My office door was closed  The patient was notified the following individuals were present in the room mamie van  He acknowledged consent and understanding of privacy and security of the video platform  The patient has agreed to participate and understands they can discontinue the visit at any time  Patient is aware this is a billable service  Amanda Faria is a 24 y o  male          HPI     Past Medical History:   Diagnosis Date    Acne     Seizure [facility])        Past Surgical History:   Procedure Laterality Date    DENTAL SURGERY         Current Outpatient Medications   Medication Sig Dispense Refill    dexmethylphenidate (FOCALIN XR) 10 MG 24 hr capsule Take 1 cap by mouth in am      dexmethylphenidate (FOCALIN XR) 5 MG 24 hr capsule Take 1 cap by mouth after school  dexmethylphenidate (FOCALIN) 2 5 MG tablet 1 tab after school      ISOtretinoin (ACCUTANE) 30 MG capsule Take 1 capsule (30 mg total) by mouth 2 (two) times a day Ipledge # [id number] 60 capsule 0    sertraline (ZOLOFT) 50 mg tablet Take 50 mg by mouth daily       No current facility-administered medications for this visit  No Known Allergies    Review of Systems    Video Exam    Vitals:       Physical Exam     I spent 20 minutes directly with the patient during this visit      VIRTUAL VISIT DISCLAIMER    Reji Alberto acknowledges that he has consented to an online visit or consultation  He understands that the online visit is based solely on information provided by him, and that, in the absence of a face-to-face physical evaluation by the physician, the diagnosis he receives is both limited and provisional in terms of accuracy and completeness  This is not intended to replace a full medical face-to-face evaluation by the physician  Reji Dominguez understands and accepts these terms  [facility] VIRTUAL Clinic Note Follow Up Patient     Patient Name: Reji Damond  Encounter Date: 06/17/2021  If patient is a minor, he/she is accompanied, virtually, by person claiming to be:      Have you been cared for by a [facility] D[facility]atologist in the last 3 years and, if so, which one? YES    · Have you traveled outside of the [address] [place] [address] in the past 3 months or outside of the [facility] area in the last 2 weeks? No     May we call your Preferred Phone number to discuss your specific medical information? Yes     May we leave a detailed message that includes your specific medical information?         Yes      Today's Chief Concerns:   Concern #1:  accutane follow up       Current Medications:       Current Outpatient Medications:     dexmethylphenidate (FOCALIN XR) 10 MG 24 hr capsule, Take 1 cap by mouth in am, Disp: , Rfl:     dexmethylphenidate (FOCALIN XR) 5 MG 24 hr capsule, Take 1 cap by mouth after school, Disp: , Rfl:     dexmethylphenidate (FOCALIN) 2 5 MG tablet, 1 tab after school, Disp: , Rfl:     ISOtretinoin (ACCUTANE) 30 MG capsule, Take 1 capsule (30 mg total) by mouth 2 (two) times a day Ipledge # [id number], Disp: 60 capsule, Rfl: 0    sertraline (ZOLOFT) 50 mg tablet, Take 50 mg by mouth daily, Disp: , Rfl:       Review of Systems:  Have you recently had or currently have any of the following? If YES, what are you doing for the problem? · Fever, chills or unintended weight loss: No  · Sudden loss or change in your vision: No  · Nausea, vomiting or blood in your stool: No  · Painful or swollen joints: No  · Wheezing or cough: No  · Changing mole or non-healing wound: No  · Nosebleeds: YES  · Excessive sweating: No  · Easy or prolonged bleeding? No  · Over the last 2 weeks, how often have you been bothered by the following problems? · Taking little interest or pleasure in doing things: 1 - Not at All  · Feeling down, depressed, or hopeless: 1 - Not at All  · Rapid heartbeat with epinephrine:  No    · FEMALES ONLY:    · Are you pregnant or planning to become pregnant? No  · Are you currently or planning to be nursing or breast feeding? No    · Any known allergies?      · No Known Allergies      Physical Exam:     Was a chaperone (Derm Clinical Assistant) present throughout the entire virtual Physical Exam? Yes    o     CONSTITUTIONAL:   Appearance: alert, well appearing, and in no distress  PSYCH: Normal mood and affect  EYES: Normal appearing eyes with normal color; no obvious deformities  ENT: Normal ears, nose, lips and neck with normal color and no obvious deformities; no obvious difficulty swallowing  CARDIOVASCULAR: No obvious edema; no obvious jugular venous distension  RESPIRATORY: Normal appearing respirations; no obvious shortness of breath  HEME/LYMPH/IMMUNO:  Normal color without obvious pallor, jaundice, petechiae or bleeding; no obvious cervical chain masses    SKIN:  FULL ORGAN SYSTEM EXAM  Hair, Scalp, Ears, Face Normal except as noted below in Assessment   Neck, Cervical Chain Nodes    Right Arm/Hand/Fingers    Left Arm/Hand/Fingers    Chest/Breasts/Axillae    Abdomen, Umbilicus    Back/Spine    Groin/Genitalia/Buttocks    Right Leg, Foot, Toes    Left Leg, Foot, Toes         Assessment and Plan by Diagnosis:    ISOTRETINOIN "ACCUTANE": MALE    Age:20 y o  Weight: unable to obtain virtual visit   Pulse: unable to obtain virtual visit   Blood Pressure: unable to obtain virtual visit     Ipledge number: [id number]  Last 4 digits SS: 1957      "Goal Dose" in mg (125 mg x weight in kg): 8,388 mg    Interim month   "Daily Dose" of Isotretinoin the patient has actually been taking since last visit (this is usually what was prescribed): 60 mg   "Total # of Days" patient took Isotretinoin since last visit (this is usually 30):  30 days   "Total Monthly Dose" in mg since last visit (this equals "Daily Dose" x "Total # of Days"): 1,800 mg    Cumulative dosage   "Total cumulative Dose" in mg (add the above "Total Monthly Dose" with "Total Cumulative Dose" from last visit: 9,400 mg        Symptoms   Conjunctivitis: No   Night Blindness/ Issues with night vision: No   Focusing Problems: No   Dry Lips/Eyes: YES   Dry Skin: YES   Rash: No   Nose Bleeds: YES   Angular cheilitis (cracking in corner of lips): No   Headaches: No   Photosensitivity: YES   Dry Anus: No   Depression: No   Mood Changes: No   Suicidal thoughts: No   Fatigue: No   Weight Loss: No   Muscle Pain/Stiffness: No   Abdominal pain: No   Diarrhea: No   Bloody stools: No         Physical Exam   Psychiatric/Mood: normal    Anatomic Location Affected:   Face    Morphological Description:  o Open/Closed Comedones:  - No evidence ("Clear")  o Inflammatory Papules/Pustules:  - No evidence ("Clear")  o Nodules:  - No evidence ("Clear")  o Scarring:  - Few ("Mild")  o Excoriations:  - No evidence ("Clear")  o Local Skin Redness/Erythema:  - No evidence ("Clear")  o Local Skin Dryness/Scaling:  - No evidence ("Clear")  o Local Skin Dyspigmentation:  - No evidence ("Clear")   Pertinent Positives:   Pertinent Negatives:        DIAGNOSES:     Acne Vulgaris   High Risk Medication   Medication Monitoring   Xerosis (see below for patient education)    Assessment and Plan:   Target Total Dose per KILOgram:  125 mg/KILOgram (this may change this on a patient-by-patient basis)   Planned daily dose for next 30 days: 60 mg    Please remember to add patient's "Ipledge number" to actual prescription):     Return to clinic in about 1 month, please review ipledge guidelines in terms of timing (see below for patient education)   Get labs 1-2 days before next appointment: no   Patient confirmed in iPledge: YES   Patients counseled:  - Cannot donate blood while taking isotretinoin and for 1 month after completing therapy  YES  - Do not share medication with anyone  YES  - Possible side effects discussed, including sun sensitivity, dry lips/eyes/skin, headaches, blurry vision (pseudotumor cerebri), muscle/joint aches, GI upset, bloody stools (IBD including Crohns/Ulcerative Colitis), jaundice, liver dysfunction, lipid abnormalities, bone marrow dysfunction, mood effects, thoughts of hurting oneself or others, and flaring of acne (acne fulminans/SAPPHO)  YES  - Report any side effects of mood swings or depression and stop medication upon occurrence  YES      Isotretinoin for Acne   Isotretinoin is a retinoid medication that is taken by mouth to treat severe nodular acne  Typically, it is used once other acne treatments have not worked, such as oral antibiotics  Usually isotretinoin is taken for 4 to 6 months, although the length of treatment can vary from person to person    While most patients acne improves and may even clear with this medication, in 20% of patients acne can come back  This requires additional acne treatment or even a second cycle of isotretinoin  How should I take isotretinoin?  Isotretinoin dosing is weight-based and should be taken exactly as prescribed   If you miss a dose, skip that dose  Do not take 2 doses at the same time   Take with food to help with absorption   All instructions in the iPLEDGE program packet (www [employer]) that was provided must be followed (see below for highlights)   You will get a 30 day supply of isotretinoin at a time  It cannot be automatically refilled  Make certain that you have been given enough medication to last 30 days as pharmacists are unable to refill or make changes within a month   You must return to your dermatologist every month to make sure you are not having any serious side effects from isotretinoin  For female patients, this visit will always include a monthly pregnancy test  Other laboratory studies, including liver function tests, cholesterol and triglycerides, must also be conducted before and during treatment  What should I avoid while taking isotretinoin?  Do not donate blood while take isotretinoin or until 1 months after coming off the medication   Do not have cosmetic procedures to smooth your skin, including waxing, dermabrasion, or laser procedures, while taking this medication and for at least 6 months after you stop   Do not share isotretinoin with any other people  It can cause birth defects and other serious health problems   Do not use any other acne medications, including medicated washes, cleansers, creams or antibiotic pills during your treatment with isotretinoin unless expressly directed to by your dermatologist      Initiating isotretinoin & the iPLEDGE Program    The iPLEDGE Program is a strict, government-required program to prevent females from becoming pregnant while on isotretinoin  All females and males must participate   Note: Your provider must follow this program and cannot change any of the requirements   Before starting isotretinoin, your provider will talk to you about the safe use of this medication and you will need to sign consent forms in order to receive treatment   If you fail to keep appointments, you will be unable to get your prescription filled   For male patients and women of non-childbearing age: There is no waiting period  Once laboratory tests are done, treatment can start   For more information, visit: [email or web address] [facility]/    What are the possible side effects of isotretinoin? What should I do? Most side effects from isotretinoin are mild and can be easily improved with simple remedies  Others are more concerning   Dry skin and eyes, chapped lips and dry nose that may lead to nosebleeds  o Dry Skin: Apply sensitive skin moisturizers to dry skin at least two times a day  You may need sunscreen (SPF 30) in the morning and to reapply when outside  o Dry Eyes: Use saline eye drops or artificial tears  o Dry Nose/Nosebleeds: Use saline nasal spray (ex  Ayr) during the day and at bedtime  To stop nosebleeds, hold pressure  If this does not work, call your dermatologist     o Chapped lips: apply petrolatum-based lip balms routinely  Avoid anything medicated   Contact your dermatologist for excessive dryness, cracks, tenderness or pain   Increased blood fats and cholesterol (usually in patients with a personal or family history of cholesterol or triglyceride problems)   Vision problems affecting your ability to see in the dark and drive at night   Bone, muscle and tendon aches can occur  Additional stretching before and after activities may help relieve aches  If you are otherwise healthy, consider the use of ibuprofen or naproxen    If you are unsure if you can use these pain medications, ask your doctor first  Also, call your doctor if you experience severe back pain, joint pain, or a broken bone    Changes in mood, including anxiety, depressive symptoms or suicidal thoughts which may or may not be temporary  Notify your doctor if your or a family member have suffered from these conditions or if you have any concerns during treatment   Serious birth defects or miscarriage can occur while taking this medication and for one month after taking your last dose of isotretinoin  You must not get pregnant while taking isotretinoin  Once the medication is out of your system, 30 days, there is no effect on the baby   Increased pressure in the brain  Call your doctor right away if you experience bad headache, blurred vision, dizziness, nausea or vomiting, or seizures   Skin rash - call your doctor right away if you develop any rashes or blisters on the skin   Liver damage - call your doctor right away if you experience severe stomach, chest or bowel pain, painful swallowing, diarrhea, blood in your stool, yellowing of your skin or eyes, or dark urine   Gastrointestinal bleeding  If you experience unusual abdominal pain or red or black/tarry stools, call your doctor immediately  You should also notify your doctor if you or a family member has a history of ulcerative colitis or Crohns disease   Worsening acne  Mild worsening of acne can occur in the first few weeks of using isotreinoin  If your acne is getting significantly worse, call your doctor  This may require temporarily stopping the isotretinoin and possibly adding other medications  XEROSIS ("DRY SKIN")    Dry skin refers to skin that feels dry to touch  Dry skin has a dull surface with a rough, scaly quality  The skin is less pliable and cracked  When dryness is severe, the skin may become inflamed and fissured  Although any body site can be dry, dry skin tends to affect the shins more than any other site      Dry skin is lacking moisture in the outer horny cell layer (stratum corneum) and this results in cracks in the skin surface  Dry skin is also called xerosis, xeroderma or asteatosis (lack of fat)  It can affect males and females of all ages  There is some racial variability in water and lipid content of the skin   Dry skin that starts in early childhood may be one of about 20 types of ichthyosis (fish-scale skin)  There is often a family history of dry skin   Dry skin is commonly seen in people with atopic dermatitis   Nearly everyone > 60 years has dry skin  Dry skin that begins later may be seen in people with certain diseases and conditions   Postmenopausal women   Hypothyroidism   Chronic renal disease    Malnutrition and weight loss    Subclinical dermatitis    Treatment with certain drugs such as oral retinoids, diuretics and epidermal growth factor receptor inhibitors    People exposed to a dry environment may experience dry skin   Low humidity: in desert climates or cool, windy conditions    Excessive air conditioning    Direct heat from a fire or fan heater    Excessive bathing    Contact with soap, detergents and solvents    Inappropriate topical agents such as alcohol    Frictional irritation from rough clothing or abrasives    Dry skin is due to abnormalities in the integrity of the barrier function of the stratum corneum, which is made up of corneocytes   There is an overall reduction in the lipids in the stratum corneum   Ratio of ceramides, cholesterol and free fatty acids may be normal or altered   There may be a reduction in the proliferation of keratinocytes   Keratinocyte subtypes change in dry skin with a decrease in keratins K1, K10 and increase in K5, K14     Involucrin (a protein) may be expressed early, increasing cell stiffness   The result is retention of corneocytes and reduced water-holding capacity  What is the treatment for dry skin? The mainstay of treatment of dry skin and ichthyosis is moisturisers/emollients   They should be applied liberally and often enough to:   Reduce itch    Improve the barrier function    Prevent entry of irritants, bacteria    Reduce transepidermal water loss  When considering which emollient is most suitable, consider:   Severity of the dryness    Tolerance    Personal preference    Cost and availability  Emollients generally work best if applied to damp skin, if pH is below 7 (acidic), and if containing humectants such as urea or propylene glycol  Additional treatments include:   Topical steroid if itchy or there is dermatitis -- choose an emollient base    Topical calcineurin inhibitors if topical steroids are unsuitable  How can dry skin be prevented? Eliminate aggravating factors:   Reduce the frequency of bathing   A humidifier in winter and air conditioner in summer    Compare having a short shower with a prolonged soak in a bath   Use lukewarm, not hot, water   Replace standard soap with a substitute such as a synthetic detergent cleanser, water-miscible emollient, bath oil, anti-pruritic tar oil, colloidal oatmeal etc     Apply an emollient liberally and often, particularly shortly after bathing, and when itchy  The drier the skin, the thicker this should be, especially on the hands  What is the outlook for dry skin? A tendency to dry skin may persist life-long, or it may improve once contributing factors are controlled    Scribe Attestation    I,:  Darcie Balbuena MA am acting as a scribe while in the presence of the attending physician :       I,:  Carlos Lopez MD personally performed the services described in this documentation    as scribed in my presence :

## 2021-07-12 ENCOUNTER — OFFICE VISIT (OUTPATIENT)
Dept: DERMATOLOGY | Facility: CLINIC | Age: 21
End: 2021-07-12
Payer: COMMERCIAL

## 2021-07-12 VITALS — WEIGHT: 142.6 LBS | TEMPERATURE: 97.2 F | HEART RATE: 68 BPM | BODY MASS INDEX: 19.31 KG/M2 | HEIGHT: 72 IN

## 2021-07-12 DIAGNOSIS — L70.0 ACNE VULGARIS: ICD-10-CM

## 2021-07-12 DIAGNOSIS — D22.9 NEVUS: Primary | ICD-10-CM

## 2021-07-12 PROCEDURE — 3008F BODY MASS INDEX DOCD: CPT | Performed by: DERMATOLOGY

## 2021-07-12 PROCEDURE — 99214 OFFICE O/P EST MOD 30 MIN: CPT | Performed by: DERMATOLOGY

## 2021-07-12 RX ORDER — ISOTRETINOIN 30 MG/1
30 CAPSULE ORAL 2 TIMES DAILY
Qty: 60 CAPSULE | Refills: 0 | Status: SHIPPED | OUTPATIENT
Start: 2021-07-12 | End: 2022-05-26

## 2021-07-12 NOTE — PATIENT INSTRUCTIONS
Assessment and Plan:   Target Total Dose per KILOgram:  125 mg/KILOgram (this may change this on a patient-by-patient basis)   Planned daily dose for next 30 days: 60 mg    Please remember to add patient's "Ipledge number" to actual prescription):     Return to clinic in about 1 month, please review ipledge guidelines in terms of timing (see below for patient education)   Get labs 1-2 days before next appointment: YES   Patient confirmed in iPledge: YES   Patients counseled:  - Cannot donate blood while taking isotretinoin and for 1 month after completing therapy  YES  - Do not share medication with anyone  YES  - Possible side effects discussed, including sun sensitivity, dry lips/eyes/skin, headaches, blurry vision (pseudotumor cerebri), muscle/joint aches, GI upset, bloody stools (IBD including Crohns/Ulcerative Colitis), jaundice, liver dysfunction, lipid abnormalities, bone marrow dysfunction, mood effects, thoughts of hurting oneself or others, and flaring of acne (acne fulminans/SAPPHO)  YES  - Report any side effects of mood swings or depression and stop medication upon occurrence  YES      Isotretinoin for Acne   Isotretinoin is a retinoid medication that is taken by mouth to treat severe nodular acne  Typically, it is used once other acne treatments have not worked, such as oral antibiotics  Usually isotretinoin is taken for 4 to 6 months, although the length of treatment can vary from person to person  While most patients acne improves and may even clear with this medication, in 20% of patients acne can come back  This requires additional acne treatment or even a second cycle of isotretinoin  How should I take isotretinoin?  Isotretinoin dosing is weight-based and should be taken exactly as prescribed   If you miss a dose, skip that dose  Do not take 2 doses at the same time   Take with food to help with absorption     All instructions in the [facility] program packet (www [employer]) that was provided must be followed (see below for highlights)   You will get a 30 day supply of isotretinoin at a time  It cannot be automatically refilled  Make certain that you have been given enough medication to last 30 days as pharmacists are unable to refill or make changes within a month   You must return to your dermatologist every month to make sure you are not having any serious side effects from isotretinoin  For female patients, this visit will always include a monthly pregnancy test  Other laboratory studies, including liver function tests, cholesterol and triglycerides, must also be conducted before and during treatment  What should I avoid while taking isotretinoin?  Do not donate blood while take isotretinoin or until 1 months after coming off the medication   Do not have cosmetic procedures to smooth your skin, including waxing, dermabrasion, or laser procedures, while taking this medication and for at least 6 months after you stop   Do not share isotretinoin with any other people  It can cause birth defects and other serious health problems   Do not use any other acne medications, including medicated washes, cleansers, creams or antibiotic pills during your treatment with isotretinoin unless expressly directed to by your dermatologist      Initiating isotretinoin & the iPLEDGE Program    The iPLEDGE Program is a strict, government-required program to prevent females from becoming pregnant while on isotretinoin  All females and males must participate  Note: Your provider must follow this program and cannot change any of the requirements   Before starting isotretinoin, your provider will talk to you about the safe use of this medication and you will need to sign consent forms in order to receive treatment   If you fail to keep appointments, you will be unable to get your prescription filled        For male patients and women of non-childbearing age: There is no waiting period  Once laboratory tests are done, treatment can start   For more information, visit: [email or web address]    What are the possible side effects of isotretinoin? What should I do? Most side effects from isotretinoin are mild and can be easily improved with simple remedies  Others are more concerning   Dry skin and eyes, chapped lips and dry nose that may lead to nosebleeds  o Dry Skin: Apply sensitive skin moisturizers to dry skin at least two times a day  You may need sunscreen (SPF 30) in the morning and to reapply when outside  o Dry Eyes: Use saline eye drops or artificial tears  o Dry Nose/Nosebleeds: Use saline nasal spray (ex  Ayr) during the day and at bedtime  To stop nosebleeds, hold pressure  If this does not work, call your dermatologist     o Chapped lips: apply petrolatum-based lip balms routinely  Avoid anything medicated   Contact your dermatologist for excessive dryness, cracks, tenderness or pain   Increased blood fats and cholesterol (usually in patients with a personal or family history of cholesterol or triglyceride problems)   Vision problems affecting your ability to see in the dark and drive at night   Bone, muscle and tendon aches can occur  Additional stretching before and after activities may help relieve aches  If you are otherwise healthy, consider the use of ibuprofen or naproxen  If you are unsure if you can use these pain medications, ask your doctor first  Also, call your doctor if you experience severe back pain, joint pain, or a broken bone    Changes in mood, including anxiety, depressive symptoms or suicidal thoughts which may or may not be temporary  Notify your doctor if your or a family member have suffered from these conditions or if you have any concerns during treatment      Serious birth defects or miscarriage can occur while taking this medication and for one month after taking your last dose of isotretinoin  You must not get pregnant while taking isotretinoin  Once the medication is out of your system, 30 days, there is no effect on the baby   Increased pressure in the brain  Call your doctor right away if you experience bad headache, blurred vision, dizziness, nausea or vomiting, or seizures   Skin rash - call your doctor right away if you develop any rashes or blisters on the skin   Liver damage - call your doctor right away if you experience severe stomach, chest or bowel pain, painful swallowing, diarrhea, blood in your stool, yellowing of your skin or eyes, or dark urine   Gastrointestinal bleeding  If you experience unusual abdominal pain or red or black/tarry stools, call your doctor immediately  You should also notify your doctor if you or a family member has a history of ulcerative colitis or Crohns disease   Worsening acne  Mild worsening of acne can occur in the first few weeks of using isotreinoin  If your acne is getting significantly worse, call your doctor  This may require temporarily stopping the isotretinoin and possibly adding other medications  XEROSIS ("DRY SKIN")    Dry skin refers to skin that feels dry to touch  Dry skin has a dull surface with a rough, scaly quality  The skin is less pliable and cracked  When dryness is severe, the skin may become inflamed and fissured  Although any body site can be dry, dry skin tends to affect the shins more than any other site  Dry skin is lacking moisture in the outer horny cell layer (stratum corneum) and this results in cracks in the skin surface  Dry skin is also called xerosis, xeroderma or asteatosis (lack of fat)  It can affect males and females of all ages  There is some racial variability in water and lipid content of the skin   Dry skin that starts in early childhood may be one of about 20 types of ichthyosis (fish-scale skin)  There is often a family history of dry skin   Dry skin is commonly seen in people with atopic dermatitis   Nearly everyone > 60 years has dry skin  Dry skin that begins later may be seen in people with certain diseases and conditions   Postmenopausal women   Hypothyroidism   Chronic renal disease    Malnutrition and weight loss    Subclinical dermatitis    Treatment with certain drugs such as oral retinoids, diuretics and epidermal growth factor receptor inhibitors    People exposed to a dry environment may experience dry skin   Low humidity: in desert climates or cool, windy conditions    Excessive air conditioning    Direct heat from a fire or fan heater    Excessive bathing    Contact with soap, detergents and solvents    Inappropriate topical agents such as alcohol    Frictional irritation from rough clothing or abrasives    Dry skin is due to abnormalities in the integrity of the barrier function of the stratum corneum, which is made up of corneocytes   There is an overall reduction in the lipids in the stratum corneum   Ratio of ceramides, cholesterol and free fatty acids may be normal or altered   There may be a reduction in the proliferation of keratinocytes   Keratinocyte subtypes change in dry skin with a decrease in keratins K1, K10 and increase in K5, K14     Involucrin (a protein) may be expressed early, increasing cell stiffness   The result is retention of corneocytes and reduced water-holding capacity  What is the treatment for dry skin? The mainstay of treatment of dry skin and ichthyosis is moisturisers/emollients  They should be applied liberally and often enough to:   Reduce itch    Improve the barrier function    Prevent entry of irritants, bacteria    Reduce transepidermal water loss  When considering which emollient is most suitable, consider:   Severity of the dryness    Tolerance    Personal preference    Cost and availability      Emollients generally work best if applied to damp skin, if pH is below 7 (acidic), and if containing humectants such as urea or propylene glycol  Additional treatments include:   Topical steroid if itchy or there is dermatitis -- choose an emollient base    Topical calcineurin inhibitors if topical steroids are unsuitable  How can dry skin be prevented? Eliminate aggravating factors:   Reduce the frequency of bathing   A humidifier in winter and air conditioner in summer    Compare having a short shower with a prolonged soak in a bath   Use lukewarm, not hot, water   Replace standard soap with a substitute such as a synthetic detergent cleanser, water-miscible emollient, bath oil, anti-pruritic tar oil, colloidal oatmeal etc     Apply an emollient liberally and often, particularly shortly after bathing, and when itchy  The drier the skin, the thicker this should be, especially on the hands  What is the outlook for dry skin? A tendency to dry skin may persist life-long, or it may improve once contributing factors are controlled  Assessment and Plan:  Based on a thorough discussion of this condition and the management approach to it (including a comprehensive discussion of the known risks, side effects and potential benefits of treatment), the patient (family) agrees to implement the following specific plan:   Reassure benign    Monitor for any changes      Melanocytic Nevi  Melanocytic nevi ("moles") are caused by collections of the color producing skin cells, or melanocytes, in 1 area in the skin  They can range in color from pink to dark brown and be either raised or flat  Some moles are present at birth (I e , "congenital nevi"), while others come up later in life (i e , "acquired nevi")  Gandhi Clap exposure also stimulates the body to make more moles, ie the more sun you get the more moles you'll grow  Clinically distinguishing a healthy mole from melanoma may be difficult   The "ABCDE's" of moles have been suggested as a means of helping to alert a person to a suspicious mole and the possible increased risk of melanoma  Asymmetry: Healthy moles tend to be symmetric, while melanomas are often asymmetric  Asymmetry means if you draw a line through the mole, the two halves do not match in color, size, shape, or surface texture Any mole that starts to demonstrate "asymmetry" should be examined promptly by a board certified dermatologist      Border: Healthy moles tend to have discrete, even borders  The border of a melanoma often blends into the normal skin and does not sharply delineate the mole from normal skin  Any mole that starts to demonstrate "uneven borders" should be examined promptly     Color: Healthy moles tend to be one color throughout  Melanomas tend to be made up of different colors ranging from dark black, blue, white, or red  Any mole that demonstrates a color change should be examined promptly    Diameter: Healthy moles tend to be smaller than 0 6 cm in size; an exception are "congenital nevi" that can be larger  Melanomas tend to grow and can often be greater than 0 6 cm (1/4 of an inch, or the size of a pencil eraser)  This is only a guideline, and many normal moles may be larger than 0 6 cm without being unhealthy  Any mole that starts to change in size (small to bigger or bigger to smaller) should be examined promptly    Evolving: Healthy moles tend to "stay the same "  Melanomas may often show signs of change or evolution such as a change in size, shape, color, or elevation  Any mole that starts to itch, bleed, crust, burn, hurt, or ulcerate or demonstrate a change or evolution should be examined promptly by a board certified dermatologist       What are atypical moles or dysplastic nevi? Dysplastic moles are moles that have some of the ABCDE  changes listed above but  are not cancerous  Sometimes a biopsy and microscopic examination are needed to determine the difference   They may indicate an increased risk of melanoma in that person, especially if there is a family history of melanoma  What is a Melanoma? The main concern when looking at a new or changing mole it to evaluate whether it may be a melanoma  The appearance of a "new mole" remains one of the most reliable methods for identifying a malignant melanoma  A melanoma is a type of skin cancer that can be deadly if it spreads throughout the body  The prognosis of a Melanoma depends on how deep it has penetrated in the skin  If caught early, they generally will not have had time to grow into the deeper layers of the skin and they cure rate is then very high  Once the melanoma grows deeper into the skin, the cure rate drops dramatically  Therefore, early detection and removal of a malignant melanoma results in a much better chance of complete cure

## 2021-07-12 NOTE — PROGRESS NOTES
[facility] Follow Up Note    Patient Name: More De Oliveira  Encounter Date: 07/12/2021    Today's Chief Concerns:  Sobia Hayes Concern #1:  Follow up Accutane       Current Medications:    Current Outpatient Medications:     dexmethylphenidate (FOCALIN XR) 10 MG 24 hr capsule, Take 1 cap by mouth in am, Disp: , Rfl:     dexmethylphenidate (FOCALIN XR) 5 MG 24 hr capsule, Take 1 cap by mouth after school, Disp: , Rfl:     dexmethylphenidate (FOCALIN) 2 5 MG tablet, 1 tab after school, Disp: , Rfl:     ISOtretinoin (ACCUTANE) 30 MG capsule, Take 1 capsule (30 mg total) by mouth 2 (two) times a day Ipledge # [id number], Disp: 60 capsule, Rfl: 0    sertraline (ZOLOFT) 50 mg tablet, Take 50 mg by mouth daily, Disp: , Rfl:     CONSTITUTIONAL:   Vitals:    07/12/21 1358   Pulse: 68   Temp: (!) 97 2 °F (36 2 °C)   Weight: 64 7 kg (142 lb 9 6 oz)   Height: 6' (1 829 m)             Specific Alerts:    Have you been seen by a [facility] in the last 3 years? YES    Are you pregnant or planning to become pregnant? N/A    Are you currently or planning to be nursing or breast feeding? N/A    No Known Allergies    May we call your Preferred Phone number to discuss your specific medical information? YES    May we leave a detailed message that includes your specific medical information? YES    Have you traveled outside of the [facility] in the past 3 months? No    Do you currently have a pacemaker or defibrillator? No    Do you have any artificial heart valves, joints, plates, screws, rods, stents, pins, etc? No   - If Yes, were any placed within the last 2 years? Do you require any medications prior to a surgical procedure? No   - If Yes, for which procedure? N/a    - If Yes, what medications to you require? N/a     Are you taking any medications that cause you to bleed more easily ("blood thinners") No    Have you ever experienced a rapid heartbeat with epinephrine?  No    Have you ever been treated with "gold" (gold sodium thiomalate) therapy? No    [facility] can help with wrinkles, "laugh lines," facial volume loss, "double chin," "love handles," age spots, and more  Are you interested in learning today about some of the skin enhancement procedures that we offer? (If Yes, please provide more detail) No    Review of Systems:  Have you recently had or currently have any of the following? · Fever or chills: No  · Night Sweats: No  · Headaches: No  · Weight Gain: No  · Weight Loss: No  · Blurry Vision: No  · Nausea: No  · Vomiting: No  · Diarrhea: No  · Blood in Stool: No  · Abdominal Pain: No  · Itchy Skin: No  · Painful Joints: No  · Swollen Joints: No  · Muscle Pain: No  · Irregular Mole: No  · Sun Burn: No  · Dry Skin: No  · Skin Color Changes: No  · Scar or Keloid: No  · Cold Sores/Fever Blisters: No  · Bacterial Infections/MRSA: No  · Anxiety: No  · Depression: No  · Suicidal or Homicidal Thoughts: No      PSYCH: Normal mood and affect  EYES: Normal conjunctiva  ENT: Normal lips and oral mucosa  CARDIOVASCULAR: No edema  RESPIRATORY: Normal respirations  HEME/LYMPH/IMMUNO:  No regional lymphadenopathy except as noted below in ASSESSMENT AND PLAN BY DIAGNOSIS    FULL ORGAN SYSTEM SKIN EXAM (SKIN)   Face Normal except as noted below in Assessment   Neck, Cervical Chain Nodes    Right Arm/Hand/Fingers    Left Arm/Hand/Fingers    Chest Viewed areas Normal except as noted below in Assessment   Abdomen, Umbilicus    Back/Spine Normal except as noted below in Assessment   Groin/Genitalia/Buttocks    Right Leg, Foot, Toes    Left Leg, Foot, Toes        ISOTRETINOIN "ACCUTANE": MALE    Age:20 y o    Weight: 142  Pulse: 68  Blood Pressure: 108/72    Ipledge number: [id number]  Last 4 digits SS: 1957      "Goal Dose" in mg (125 mg x weight in kg): 8,388 mg    Interim month   "Daily Dose" of Isotretinoin the patient has actually been taking since last visit (this is usually what was prescribed): 60 mg   "Total # of Days" patient took Isotretinoin since last visit (this is usually 30):  30 days   "Total Monthly Dose" in mg since last visit (this equals "Daily Dose" x "Total # of Days"): 1800 mg    Cumulative dosage   "Total cumulative Dose" in mg (add the above "Total Monthly Dose" with "Total Cumulative Dose" from last visit: 42127 mg        Symptoms   Conjunctivitis: No   Night Blindness/ Issues with night vision: No   Focusing Problems: No   Dry Lips/Eyes: YES   Dry Skin: YES   Rash: No   Nose Bleeds: No   Angular cheilitis (cracking in corner of lips): No   Headaches: No   Photosensitivity: YES   Dry Anus: No   Depression: No   Mood Changes: No   Suicidal thoughts: No   Fatigue: No   Weight Loss: No   Muscle Pain/Stiffness: No   Abdominal pain: No   Diarrhea: No   Bloody stools: No         Physical Exam   Psychiatric/Mood: normal   Anatomic Location Affected:  Face, chest and back    Morphological Description:  o Open/Closed Comedones:  - No evidence ("Clear")  o Inflammatory Papules/Pustules:  - No evidence ("Clear")  o Nodules:  - No evidence ("Clear")  o Scarring:  - Few ("Mild")  o Excoriations:  - No evidence ("Clear")  o Local Skin Redness/Erythema:  - No evidence ("Clear")  o Local Skin Dryness/Scaling:  - No evidence ("Clear")  o Local Skin Dyspigmentation:  - No evidence ("Clear")   Pertinent Positives:   Pertinent Negatives:      Labs   Date of last labs: 04/21/2021         Additional History of Present Condition:  Patient happy with improvement         DIAGNOSES:     Acne Vulgaris   High Risk Medication   Medication Monitoring   Xerosis (see below for patient education)    Assessment and Plan:   Target Total Dose per KILOgram:  125 mg/KILOgram (this may change this on a patient-by-patient basis)   Planned daily dose for next 30 days: 60 mg    Please remember to add patient's "Ipledge number" to actual prescription):     Return to clinic in about 1 month, please review ipledge guidelines in terms of timing (see below for patient education)   Get labs 1-2 days before next appointment: no   Patient confirmed in iPledge: YES   Patients counseled:  - Cannot donate blood while taking isotretinoin and for 1 month after completing therapy  YES  - Do not share medication with anyone  YES  - Possible side effects discussed, including sun sensitivity, dry lips/eyes/skin, headaches, blurry vision (pseudotumor cerebri), muscle/joint aches, GI upset, bloody stools (IBD including Crohns/Ulcerative Colitis), jaundice, liver dysfunction, lipid abnormalities, bone marrow dysfunction, mood effects, thoughts of hurting oneself or others, and flaring of acne (acne fulminans/SAPPHO)  YES  - Report any side effects of mood swings or depression and stop medication upon occurrence  YES      Isotretinoin for Acne   Isotretinoin is a retinoid medication that is taken by mouth to treat severe nodular acne  Typically, it is used once other acne treatments have not worked, such as oral antibiotics  Usually isotretinoin is taken for 4 to 6 months, although the length of treatment can vary from person to person  While most patients acne improves and may even clear with this medication, in 20% of patients acne can come back  This requires additional acne treatment or even a second cycle of isotretinoin  How should I take isotretinoin?  Isotretinoin dosing is weight-based and should be taken exactly as prescribed   If you miss a dose, skip that dose  Do not take 2 doses at the same time   Take with food to help with absorption   All instructions in the iPLEDGE program packet (www [employer]) that was provided must be followed (see below for highlights)   You will get a 30 day supply of isotretinoin at a time  It cannot be automatically refilled    Make certain that you have been given enough medication to last 30 days as pharmacists are unable to refill or make changes within a month   You must return to your dermatologist every month to make sure you are not having any serious side effects from isotretinoin  For female patients, this visit will always include a monthly pregnancy test  Other laboratory studies, including liver function tests, cholesterol and triglycerides, must also be conducted before and during treatment  What should I avoid while taking isotretinoin?  Do not donate blood while take isotretinoin or until 1 months after coming off the medication   Do not have cosmetic procedures to smooth your skin, including waxing, dermabrasion, or laser procedures, while taking this medication and for at least 6 months after you stop   Do not share isotretinoin with any other people  It can cause birth defects and other serious health problems   Do not use any other acne medications, including medicated washes, cleansers, creams or antibiotic pills during your treatment with isotretinoin unless expressly directed to by your dermatologist      Initiating isotretinoin & the iPLEDGE Program    The iPLEDGE Program is a strict, government-required program to prevent females from becoming pregnant while on isotretinoin  All females and males must participate  Note: Your provider must follow this program and cannot change any of the requirements   Before starting isotretinoin, your provider will talk to you about the safe use of this medication and you will need to sign consent forms in order to receive treatment   If you fail to keep appointments, you will be unable to get your prescription filled   For male patients and women of non-childbearing age: There is no waiting period  Once laboratory tests are done, treatment can start   For more information, visit: [email or web address]    What are the possible side effects of isotretinoin? What should I do?    Most side effects from isotretinoin are mild and can be easily improved with simple remedies  Others are more concerning   Dry skin and eyes, chapped lips and dry nose that may lead to nosebleeds  o Dry Skin: Apply sensitive skin moisturizers to dry skin at least two times a day  You may need sunscreen (SPF 30) in the morning and to reapply when outside  o Dry Eyes: Use saline eye drops or artificial tears  o Dry Nose/Nosebleeds: Use saline nasal spray (ex  Ayr) during the day and at bedtime  To stop nosebleeds, hold pressure  If this does not work, call your dermatologist     o Chapped lips: apply petrolatum-based lip balms routinely  Avoid anything medicated   Contact your dermatologist for excessive dryness, cracks, tenderness or pain   Increased blood fats and cholesterol (usually in patients with a personal or family history of cholesterol or triglyceride problems)   Vision problems affecting your ability to see in the dark and drive at night   Bone, muscle and tendon aches can occur  Additional stretching before and after activities may help relieve aches  If you are otherwise healthy, consider the use of ibuprofen or naproxen  If you are unsure if you can use these pain medications, ask your doctor first  Also, call your doctor if you experience severe back pain, joint pain, or a broken bone    Changes in mood, including anxiety, depressive symptoms or suicidal thoughts which may or may not be temporary  Notify your doctor if your or a family member have suffered from these conditions or if you have any concerns during treatment   Serious birth defects or miscarriage can occur while taking this medication and for one month after taking your last dose of isotretinoin  You must not get pregnant while taking isotretinoin  Once the medication is out of your system, 30 days, there is no effect on the baby   Increased pressure in the brain   Call your doctor right away if you experience bad headache, blurred vision, dizziness, nausea or vomiting, or seizures   Skin rash - call your doctor right away if you develop any rashes or blisters on the skin   Liver damage - call your doctor right away if you experience severe stomach, chest or bowel pain, painful swallowing, diarrhea, blood in your stool, yellowing of your skin or eyes, or dark urine   Gastrointestinal bleeding  If you experience unusual abdominal pain or red or black/tarry stools, call your doctor immediately  You should also notify your doctor if you or a family member has a history of ulcerative colitis or Crohns disease   Worsening acne  Mild worsening of acne can occur in the first few weeks of using isotreinoin  If your acne is getting significantly worse, call your doctor  This may require temporarily stopping the isotretinoin and possibly adding other medications  XEROSIS ("DRY SKIN")    Dry skin refers to skin that feels dry to touch  Dry skin has a dull surface with a rough, scaly quality  The skin is less pliable and cracked  When dryness is severe, the skin may become inflamed and fissured  Although any body site can be dry, dry skin tends to affect the shins more than any other site  Dry skin is lacking moisture in the outer horny cell layer (stratum corneum) and this results in cracks in the skin surface  Dry skin is also called xerosis, xeroderma or asteatosis (lack of fat)  It can affect males and females of all ages  There is some racial variability in water and lipid content of the skin   Dry skin that starts in early childhood may be one of about 20 types of ichthyosis (fish-scale skin)  There is often a family history of dry skin   Dry skin is commonly seen in people with atopic dermatitis   Nearly everyone > 60 years has dry skin  Dry skin that begins later may be seen in people with certain diseases and conditions     Postmenopausal women   Hypothyroidism   Chronic renal disease    Malnutrition and weight loss    Subclinical dermatitis    Treatment with certain drugs such as oral retinoids, diuretics and epidermal growth factor receptor inhibitors    People exposed to a dry environment may experience dry skin   Low humidity: in desert climates or cool, windy conditions    Excessive air conditioning    Direct heat from a fire or fan heater    Excessive bathing    Contact with soap, detergents and solvents    Inappropriate topical agents such as alcohol    Frictional irritation from rough clothing or abrasives    Dry skin is due to abnormalities in the integrity of the barrier function of the stratum corneum, which is made up of corneocytes   There is an overall reduction in the lipids in the stratum corneum   Ratio of ceramides, cholesterol and free fatty acids may be normal or altered   There may be a reduction in the proliferation of keratinocytes   Keratinocyte subtypes change in dry skin with a decrease in keratins K1, K10 and increase in K5, K14     Involucrin (a protein) may be expressed early, increasing cell stiffness   The result is retention of corneocytes and reduced water-holding capacity  What is the treatment for dry skin? The mainstay of treatment of dry skin and ichthyosis is moisturisers/emollients  They should be applied liberally and often enough to:   Reduce itch    Improve the barrier function    Prevent entry of irritants, bacteria    Reduce transepidermal water loss  When considering which emollient is most suitable, consider:   Severity of the dryness    Tolerance    Personal preference    Cost and availability  Emollients generally work best if applied to damp skin, if pH is below 7 (acidic), and if containing humectants such as urea or propylene glycol  Additional treatments include:   Topical steroid if itchy or there is dermatitis -- choose an emollient base    Topical calcineurin inhibitors if topical steroids are unsuitable      How can dry skin be prevented? Eliminate aggravating factors:   Reduce the frequency of bathing   A humidifier in winter and air conditioner in summer    Compare having a short shower with a prolonged soak in a bath   Use lukewarm, not hot, water   Replace standard soap with a substitute such as a synthetic detergent cleanser, water-miscible emollient, bath oil, anti-pruritic tar oil, colloidal oatmeal etc     Apply an emollient liberally and often, particularly shortly after bathing, and when itchy  The drier the skin, the thicker this should be, especially on the hands  What is the outlook for dry skin? A tendency to dry skin may persist life-long, or it may improve once contributing factors are controlled  MELANOCYTIC NEVI ("Moles")    Physical Exam:   Anatomic Location Affected:   Right elbow    Morphological Description:  Scattered, 1-4mm round to ovoid, symmetrical-appearing, even bordered, skin colored to dark brown macules/papules, mostly in sun-exposed areas   Pertinent Positives:   Pertinent Negatives: Additional History of Present Condition:  Patient states that moles haVE BEEN GROWING     Assessment and Plan:  Based on a thorough discussion of this condition and the management approach to it (including a comprehensive discussion of the known risks, side effects and potential benefits of treatment), the patient (family) agrees to implement the following specific plan:   Reassure benign    Monitor for any changes      Melanocytic Nevi  Melanocytic nevi ("moles") are caused by collections of the color producing skin cells, or melanocytes, in 1 area in the skin  They can range in color from pink to dark brown and be either raised or flat  Some moles are present at birth (I e , "congenital nevi"), while others come up later in life (i e , "acquired nevi")  Jenna An exposure also stimulates the body to make more moles, ie the more sun you get the more moles you'll grow      Clinically distinguishing a healthy mole from melanoma may be difficult  The "ABCDE's" of moles have been suggested as a means of helping to alert a person to a suspicious mole and the possible increased risk of melanoma  Asymmetry: Healthy moles tend to be symmetric, while melanomas are often asymmetric  Asymmetry means if you draw a line through the mole, the two halves do not match in color, size, shape, or surface texture Any mole that starts to demonstrate "asymmetry" should be examined promptly by a board certified dermatologist      Border: Healthy moles tend to have discrete, even borders  The border of a melanoma often blends into the normal skin and does not sharply delineate the mole from normal skin  Any mole that starts to demonstrate "uneven borders" should be examined promptly     Color: Healthy moles tend to be one color throughout  Melanomas tend to be made up of different colors ranging from dark black, blue, white, or red  Any mole that demonstrates a color change should be examined promptly    Diameter: Healthy moles tend to be smaller than 0 6 cm in size; an exception are "congenital nevi" that can be larger  Melanomas tend to grow and can often be greater than 0 6 cm (1/4 of an inch, or the size of a pencil eraser)  This is only a guideline, and many normal moles may be larger than 0 6 cm without being unhealthy  Any mole that starts to change in size (small to bigger or bigger to smaller) should be examined promptly    Evolving: Healthy moles tend to "stay the same "  Melanomas may often show signs of change or evolution such as a change in size, shape, color, or elevation  Any mole that starts to itch, bleed, crust, burn, hurt, or ulcerate or demonstrate a change or evolution should be examined promptly by a board certified dermatologist       What are atypical moles or dysplastic nevi?   Dysplastic moles are moles that have some of the ABCDE  changes listed above but  are not cancerous Sometimes a biopsy and microscopic examination are needed to determine the difference  They may indicate an increased risk of melanoma in that person, especially if there is a family history of melanoma  What is a Melanoma? The main concern when looking at a new or changing mole it to evaluate whether it may be a melanoma  The appearance of a "new mole" remains one of the most reliable methods for identifying a malignant melanoma  A melanoma is a type of skin cancer that can be deadly if it spreads throughout the body  The prognosis of a Melanoma depends on how deep it has penetrated in the skin  If caught early, they generally will not have had time to grow into the deeper layers of the skin and they cure rate is then very high  Once the melanoma grows deeper into the skin, the cure rate drops dramatically  Therefore, early detection and removal of a malignant melanoma results in a much better chance of complete cure           Scribe Attestation    I,:  Isabel Bailey MA am acting as a scribe while in the presence of the attending physician :       I,:  Humza Porter MD personally performed the services described in this documentation    as scribed in my presence :

## 2021-10-17 ENCOUNTER — TELEPHONE (OUTPATIENT)
Dept: ANESTHESIOLOGY | Facility: AMBULATORY SURGERY CENTER | Age: 21
End: 2021-10-17

## 2022-05-26 ENCOUNTER — OFFICE VISIT (OUTPATIENT)
Dept: DERMATOLOGY | Facility: CLINIC | Age: 22
End: 2022-05-26
Payer: COMMERCIAL

## 2022-05-26 VITALS — HEIGHT: 72 IN | BODY MASS INDEX: 18.96 KG/M2 | WEIGHT: 140 LBS

## 2022-05-26 DIAGNOSIS — D22.70 MULTIPLE BENIGN MELANOCYTIC NEVI OF UPPER AND LOWER EXTREMITIES AND TRUNK: ICD-10-CM

## 2022-05-26 DIAGNOSIS — L70.0 ACNE VULGARIS: Primary | ICD-10-CM

## 2022-05-26 DIAGNOSIS — D22.60 MULTIPLE BENIGN MELANOCYTIC NEVI OF UPPER AND LOWER EXTREMITIES AND TRUNK: ICD-10-CM

## 2022-05-26 DIAGNOSIS — D22.5 MULTIPLE BENIGN MELANOCYTIC NEVI OF UPPER AND LOWER EXTREMITIES AND TRUNK: ICD-10-CM

## 2022-05-26 PROCEDURE — 99213 OFFICE O/P EST LOW 20 MIN: CPT | Performed by: DERMATOLOGY

## 2022-05-26 RX ORDER — TRETINOIN 0.5 MG/G
CREAM TOPICAL
Qty: 45 G | Refills: 11 | Status: SHIPPED | OUTPATIENT
Start: 2022-05-26

## 2022-05-26 NOTE — PATIENT INSTRUCTIONS
Assessment and Plan:  Based on a thorough discussion of this condition and the management approach to it (including a comprehensive discussion of the known risks, side effects and potential benefits of treatment), the patient (family) agrees to implement the following specific plan:  Start tretinoin 0 05% cream, Spread one pea-sized amount of medication over entire face about one hour before bedtime  Assessment and Plan:  Based on a thorough discussion of this condition and the management approach to it (including a comprehensive discussion of the known risks, side effects and potential benefits of treatment), the patient (family) agrees to implement the following specific plan:  When outside we recommend using a wide brim hat, sunglasses, long sleeve and pants, sunscreen with SPF 08+ with reapplication every 2 hours, or SPF specific clothing        Melanocytic Nevi  Melanocytic nevi ("moles") are tan or brown, raised or flat areas of the skin which have an increased number of melanocytes  Melanocytes are the cells in our body which make pigment and account for skin color  Some moles are present at birth (I e , "congenital nevi"), while others come up later in life (i e , "acquired nevi")  The sun can stimulate the body to make more moles  Sunburns are not the only thing that triggers more moles  Chronic sun exposure can do it too  Clinically distinguishing a healthy mole from melanoma may be difficult, even for experienced dermatologists  The "ABCDE's" of moles have been suggested as a means of helping to alert a person to a suspicious mole and the possible increased risk of melanoma  The suggestions for raising alert are as follows:    Asymmetry: Healthy moles tend to be symmetric, while melanomas are often asymmetric  Asymmetry means if you draw a line through the mole, the two halves do not match in color, size, shape, or surface texture   Asymmetry can be a result of rapid enlargement of a mole, the development of a raised area on a previously flat lesion, scaling, ulceration, bleeding or scabbing within the mole  Any mole that starts to demonstrate "asymmetry" should be examined promptly by a board certified dermatologist      Border: Healthy moles tend to have discrete, even borders  The border of a melanoma often blends into the normal skin and does not sharply delineate the mole from normal skin  Any mole that starts to demonstrate "uneven borders" should be examined promptly by a board certified dermatologist      Color: Healthy moles tend to be one color throughout  Melanomas tend to be made up of different colors ranging from dark black, blue, white, or red  Any mole that demonstrates a color change should be examined promptly by a board certified dermatologist      Diameter: Healthy moles tend to be smaller than 0 6 cm in size; an exception are "congenital nevi" that can be larger  Melanomas tend to grow and can often be greater than 0 6 cm (1/4 of an inch, or the size of a pencil eraser)  This is only a guideline, and many normal moles may be larger than 0 6 cm without being unhealthy  Any mole that starts to change in size (small to bigger or bigger to smaller) should be examined promptly by a board certified dermatologist      Evolving: Healthy moles tend to "stay the same "  Melanomas may often show signs of change or evolution such as a change in size, shape, color, or elevation  Any mole that starts to itch, bleed, crust, burn, hurt, or ulcerate or demonstrate a change or evolution should be examined promptly by a board certified dermatologist       Dysplastic Nevi  Dysplastic moles are moles that fit the ABCDE rules of melanoma but are not identified as melanomas when examined under the microscope  They may indicate an increased risk of melanoma in that person   If there is a family history of melanoma, most experts agree that the person may be at an increased risk for developing a melanoma  Experts still do not agree on what dysplastic moles mean in patients without a personal or family history of melanoma  Dysplastic moles are usually larger than common moles and have different colors within it with irregular borders  The appearance can be very similar to a melanoma  Biopsies of dysplastic moles may show abnormalities which are different from a regular mole  Melanoma  Malignant melanoma is a type of skin cancer that can be deadly if it spreads throughout the body  The incidence of melanoma in the [place] is growing faster than any other cancer  Melanoma usually grows near the surface of the skin for a period of time, and then begins to grow deeper into the skin  Once it grows deeper into the skin, the risk of spread to other organs greatly increases  Therefore, early detection and removal of a malignant melanoma may result in a better chance at a complete cure; removal after the tumor has spread may not be as effective, leading to worse clinical outcomes such as death  The true rate of nevus transformation into a melanoma is unknown  It has been estimated that the lifetime risk for any acquired melanocytic nevus on any 21year-old individual transforming into melanoma by age [de-identified] is 0 03% (1 in 3,164) for men and 0 009% (1 in 10,800) for women  The appearance of a "new mole" remains one of the most reliable methods for identifying a malignant melanoma  Occasionally, melanomas appear as rapidly growing, blue-black, dome-shaped bumps within a previous mole or previous area of normal skin  Other times, melanomas are suspected when a mole suddenly appears or changes  Itching, burning, or pain in a pigmented lesion should increase suspicion, but most patients with early melanoma have no skin discomfort whatsoever  Melanoma can occur anywhere on the skin, including areas that are difficult for self-examination  Many melanomas are first noticed by other family members  Suspicious-looking moles may be removed for microscopic examination  You may be able to prevent death from melanoma by doing two simple things:    Try to avoid unnecessary sun exposure and protect your skin when it is exposed to the sun  People who live near the equator, people who have intermittent exposures to large amounts of sun, and people who have had sunburns in childhood or adolescence have an increased risk for melanoma  Sun sense and vigilant sun protection may be keys to helping to prevent melanoma  We recommend wearing UPF-rated sun protective clothing and sunglasses whenever possible and applying a moisturizer-sunscreen combination product (SPF 50+) such as Neutrogena Daily Defense to sun exposed areas of skin at least three times a day  Have your moles regularly examined by a board certified dermatologist AND by yourself or a family member/friend at home  We recommend that you have your moles examined at least once a year by a board certified dermatologist   Use your birthday as an annual reminder to have your "Birthday Suit" (I e , your skin) examined; it is a nice birthday gift to yourself to know that your skin is healthy appearing! Additionally, at-home self examinations may be helpful for detecting a possible melanoma  Use the ABCDEs we discussed and check your moles once a month at home

## 2022-05-26 NOTE — PROGRESS NOTES
[facility] Follow Up Note    Patient Name: Claritza Thompson  Encounter Date: 05/26/2022    Today's Chief Concerns:  Steven Sharma Concern #1: Acne follow up     Current Medications:    Current Outpatient Medications:     dexmethylphenidate (FOCALIN XR) 10 MG 24 hr capsule, Take 1 cap by mouth in am, Disp: , Rfl:     dexmethylphenidate (FOCALIN XR) 5 MG 24 hr capsule, Take 1 cap by mouth after school, Disp: , Rfl:     sertraline (ZOLOFT) 50 mg tablet, Take 50 mg by mouth daily, Disp: , Rfl:     dexmethylphenidate (FOCALIN) 2 5 MG tablet, 1 tab after school (Patient not taking: Reported on 5/26/2022), Disp: , Rfl:     ISOtretinoin (ACCUTANE) 30 MG capsule, Take 1 capsule (30 mg total) by mouth 2 (two) times a day Ipledge # [id number], Disp: 60 capsule, Rfl: 0    CONSTITUTIONAL:   Vitals:    05/26/22 1448   Weight: 63 5 kg (140 lb)   Height: 6' (1 829 m)         Specific Alerts:    Have you been seen by a [facility]atologist in the last 3 years? YES    Are you pregnant or planning to become pregnant? No    Are you currently or planning to be nursing or breast feeding? No    No Known Allergies    May we call your Preferred Phone number to discuss your specific medical information? YES    May we leave a detailed message that includes your specific medical information? YES    Have you traveled outside of the [facility] in the past 3 months? No    Do you currently have a pacemaker or defibrillator? No    Do you have any artificial heart valves, joints, plates, screws, rods, stents, pins, etc? No   - If Yes, were any placed within the last 2 years? Do you require any medications prior to a surgical procedure? No   - If Yes, for which procedure? n a   - If Yes, what medications to you require? n a    Are you taking any medications that cause you to bleed more easily ("blood thinners") No    Have you ever experienced a rapid heartbeat with epinephrine?  No    Have you ever been treated with "gold" (gold sodium thiomalate) therapy? No    [facility] can help with wrinkles, "laugh lines," facial volume loss, "double chin," "love handles," age spots, and more  Are you interested in learning today about some of the skin enhancement procedures that we offer? (If Yes, please provide more detail) No    Review of Systems:  Have you recently had or currently have any of the following? · Fever or chills: No  · Night Sweats: No  · Headaches: No  · Weight Gain: No  · Weight Loss: No  · Blurry Vision: No  · Nausea: No  · Vomiting: No  · Diarrhea: No  · Blood in Stool: No  · Abdominal Pain: No  · Itchy Skin: No  · Painful Joints: No  · Swollen Joints: No  · Muscle Pain: No  · Irregular Mole: No  · Sun Burn: No  · Dry Skin: No  · Skin Color Changes: No  · Scar or Keloid: No  · Cold Sores/Fever Blisters: No  · Bacterial Infections/MRSA: No  · Anxiety: No  · Depression: No  · Suicidal or Homicidal Thoughts: No      PSYCH: Normal mood and affect  EYES: Normal conjunctiva  ENT: Normal lips and oral mucosa  CARDIOVASCULAR: No edema  RESPIRATORY: Normal respirations  HEME/LYMPH/IMMUNO:  No regional lymphadenopathy except as noted below in ASSESSMENT AND PLAN BY DIAGNOSIS    FULL ORGAN SYSTEM SKIN EXAM (SKIN)  Hair, Scalp, Ears, Face Normal except as noted below in Assessment   Neck, Cervical Chain Nodes    Right Arm/Hand/Fingers    Left Arm/Hand/Fingers    Chest/Breasts/Axillae    Abdomen, Umbilicus    Back/Spine    Groin/Genitalia/Buttocks    Right Leg, Foot, Toes    Left Leg, Foot, Toes      1   ACNE   Physical Exam:   Anatomic Location Affected/Morphological Description:  Face rare papules    Pertinent Positives:   Pertinent Negatives:    Assessment and Plan:  Based on a thorough discussion of this condition and the management approach to it (including a comprehensive discussion of the known risks, side effects and potential benefits of treatment), the patient (family) agrees to implement the following specific plan:   Start tretinoin 0 05% cream, Spread one pea-sized amount of medication over entire face about one hour before bedtime  2  MELANOCYTIC NEVI ("Moles")    Physical Exam:   Anatomic Location Affected:   Mostly on sun-exposed areas of the right elbow    Morphological Description:  Scattered, 1-4mm round to ovoid, symmetrical-appearing, even bordered, skin colored to dark brown macules/papules, mostly in sun-exposed areas   Pertinent Positives:   Pertinent Negatives:    Assessment and Plan:  Based on a thorough discussion of this condition and the management approach to it (including a comprehensive discussion of the known risks, side effects and potential benefits of treatment), the patient (family) agrees to implement the following specific plan:   When outside we recommend using a wide brim hat, sunglasses, long sleeve and pants, sunscreen with SPF 91+ with reapplication every 2 hours, or SPF specific clothing        Melanocytic Nevi  Melanocytic nevi ("moles") are tan or brown, raised or flat areas of the skin which have an increased number of melanocytes  Melanocytes are the cells in our body which make pigment and account for skin color  Some moles are present at birth (I e , "congenital nevi"), while others come up later in life (i e , "acquired nevi")  The sun can stimulate the body to make more moles  Sunburns are not the only thing that triggers more moles  Chronic sun exposure can do it too  Clinically distinguishing a healthy mole from melanoma may be difficult, even for experienced dermatologists  The "ABCDE's" of moles have been suggested as a means of helping to alert a person to a suspicious mole and the possible increased risk of melanoma  The suggestions for raising alert are as follows:    Asymmetry: Healthy moles tend to be symmetric, while melanomas are often asymmetric    Asymmetry means if you draw a line through the mole, the two halves do not match in color, size, shape, or surface texture  Asymmetry can be a result of rapid enlargement of a mole, the development of a raised area on a previously flat lesion, scaling, ulceration, bleeding or scabbing within the mole  Any mole that starts to demonstrate "asymmetry" should be examined promptly by a board certified dermatologist      Border: Healthy moles tend to have discrete, even borders  The border of a melanoma often blends into the normal skin and does not sharply delineate the mole from normal skin  Any mole that starts to demonstrate "uneven borders" should be examined promptly by a board certified dermatologist      Color: Healthy moles tend to be one color throughout  Melanomas tend to be made up of different colors ranging from dark black, blue, white, or red  Any mole that demonstrates a color change should be examined promptly by a board certified dermatologist      Diameter: Healthy moles tend to be smaller than 0 6 cm in size; an exception are "congenital nevi" that can be larger  Melanomas tend to grow and can often be greater than 0 6 cm (1/4 of an inch, or the size of a pencil eraser)  This is only a guideline, and many normal moles may be larger than 0 6 cm without being unhealthy  Any mole that starts to change in size (small to bigger or bigger to smaller) should be examined promptly by a board certified dermatologist      Evolving: Healthy moles tend to "stay the same "  Melanomas may often show signs of change or evolution such as a change in size, shape, color, or elevation  Any mole that starts to itch, bleed, crust, burn, hurt, or ulcerate or demonstrate a change or evolution should be examined promptly by a board certified dermatologist       Dysplastic Nevi  Dysplastic moles are moles that fit the ABCDE rules of melanoma but are not identified as melanomas when examined under the microscope  They may indicate an increased risk of melanoma in that person   If there is a family history of melanoma, most experts agree that the person may be at an increased risk for developing a melanoma  Experts still do not agree on what dysplastic moles mean in patients without a personal or family history of melanoma  Dysplastic moles are usually larger than common moles and have different colors within it with irregular borders  The appearance can be very similar to a melanoma  Biopsies of dysplastic moles may show abnormalities which are different from a regular mole  Melanoma  Malignant melanoma is a type of skin cancer that can be deadly if it spreads throughout the body  The incidence of melanoma in the [place] is growing faster than any other cancer  Melanoma usually grows near the surface of the skin for a period of time, and then begins to grow deeper into the skin  Once it grows deeper into the skin, the risk of spread to other organs greatly increases  Therefore, early detection and removal of a malignant melanoma may result in a better chance at a complete cure; removal after the tumor has spread may not be as effective, leading to worse clinical outcomes such as death  The true rate of nevus transformation into a melanoma is unknown  It has been estimated that the lifetime risk for any acquired melanocytic nevus on any 21year-old individual transforming into melanoma by age [de-identified] is 0 03% (1 in 3,164) for men and 0 009% (1 in 10,800) for women  The appearance of a "new mole" remains one of the most reliable methods for identifying a malignant melanoma  Occasionally, melanomas appear as rapidly growing, blue-black, dome-shaped bumps within a previous mole or previous area of normal skin  Other times, melanomas are suspected when a mole suddenly appears or changes  Itching, burning, or pain in a pigmented lesion should increase suspicion, but most patients with early melanoma have no skin discomfort whatsoever    Melanoma can occur anywhere on the skin, including areas that are difficult for self-examination  Many melanomas are first noticed by other family members  Suspicious-looking moles may be removed for microscopic examination  You may be able to prevent death from melanoma by doing two simple things:    1  Try to avoid unnecessary sun exposure and protect your skin when it is exposed to the sun  People who live near the equator, people who have intermittent exposures to large amounts of sun, and people who have had sunburns in childhood or adolescence have an increased risk for melanoma  Sun sense and vigilant sun protection may be keys to helping to prevent melanoma  We recommend wearing UPF-rated sun protective clothing and sunglasses whenever possible and applying a moisturizer-sunscreen combination product (SPF 50+) such as Neutrogena Daily Defense to sun exposed areas of skin at least three times a day  2  Have your moles regularly examined by a board certified dermatologist AND by yourself or a family member/friend at home  We recommend that you have your moles examined at least once a year by a board certified dermatologist   Use your birthday as an annual reminder to have your "Birthday Suit" (I e , your skin) examined; it is a nice birthday gift to yourself to know that your skin is healthy appearing! Additionally, at-home self examinations may be helpful for detecting a possible melanoma  Use the ABCDEs we discussed and check your moles once a month at home        Scribe Attestation    I,:  Leatha Chahal MA am acting as a scribe while in the presence of the attending physician :       I,:  Hilda Mccall MD personally performed the services described in this documentation    as scribed in my presence :

## 2025-01-06 ENCOUNTER — OFFICE VISIT (OUTPATIENT)
Dept: FAMILY MEDICINE CLINIC | Facility: CLINIC | Age: 25
End: 2025-01-06
Payer: COMMERCIAL

## 2025-01-06 ENCOUNTER — APPOINTMENT (OUTPATIENT)
Dept: RADIOLOGY | Facility: MEDICAL CENTER | Age: 25
End: 2025-01-06
Payer: COMMERCIAL

## 2025-01-06 VITALS
BODY MASS INDEX: 21.28 KG/M2 | RESPIRATION RATE: 16 BRPM | HEIGHT: 71 IN | TEMPERATURE: 98.1 F | OXYGEN SATURATION: 99 % | HEART RATE: 67 BPM | WEIGHT: 152 LBS | SYSTOLIC BLOOD PRESSURE: 92 MMHG | DIASTOLIC BLOOD PRESSURE: 70 MMHG

## 2025-01-06 DIAGNOSIS — Z13.220 SCREENING, LIPID: ICD-10-CM

## 2025-01-06 DIAGNOSIS — M79.671 RIGHT FOOT PAIN: ICD-10-CM

## 2025-01-06 DIAGNOSIS — M79.671 RIGHT FOOT PAIN: Primary | ICD-10-CM

## 2025-01-06 PROBLEM — L70.0 ACNE VULGARIS: Status: RESOLVED | Noted: 2018-09-07 | Resolved: 2025-01-06

## 2025-01-06 PROCEDURE — 73630 X-RAY EXAM OF FOOT: CPT

## 2025-01-06 PROCEDURE — 99213 OFFICE O/P EST LOW 20 MIN: CPT | Performed by: INTERNAL MEDICINE

## 2025-01-06 RX ORDER — NAPROXEN 500 MG/1
500 TABLET ORAL 2 TIMES DAILY WITH MEALS
Qty: 60 TABLET | Refills: 5 | Status: SHIPPED | OUTPATIENT
Start: 2025-01-06

## 2025-01-06 NOTE — PROGRESS NOTES
"Name: Brett Marrero      : 2000      MRN: [id number]  Encounter Provider: Janki Cueva MD  Encounter Date: 2025   Encounter department: [facility]  :  Assessment & Plan  Right foot pain    Orders:    Ambulatory Referral to Podiatry; Future    XR foot 3+ vw right; Future    Comprehensive metabolic panel; Future    CBC and differential; Future    naproxen (Naprosyn) 500 mg tablet; Take 1 tablet (500 mg total) by mouth 2 (two) times a day with meals    Screening, lipid    Orders:    Lipid panel; Future          Depression Screening and Follow-up Plan: Patient was screened for depression during today's encounter. They screened negative with a PHQ-2 score of 0.      History of Present Illness     Complains of foot pain over the last several weeks it is slowly getting worse.  He is a runner and it started about 3 weeks ago.  Not swollen.  Only takes like 1 Tylenol or Advil for relief.      Review of Systems   Constitutional:  Negative for chills and fever.   HENT:  Negative for ear pain and sore throat.    Eyes:  Negative for pain and visual disturbance.   Respiratory:  Negative for cough and shortness of breath.    Cardiovascular:  Negative for chest pain and palpitations.   Gastrointestinal:  Negative for abdominal pain and vomiting.   Genitourinary:  Negative for dysuria and hematuria.   Musculoskeletal:  Negative for arthralgias and back pain.        Foot pain when runs   Skin:  Negative for color change and rash.   Neurological:  Negative for seizures and syncope.   All other systems reviewed and are negative.      Objective   BP 92/70 (BP Location: Left arm, Patient Position: Sitting, Cuff Size: Large)   Pulse 67   Temp 98.1 °F (36.7 °C) (Temporal)   Resp 16   Ht 5' 11.25\" (1.81 m)   Wt 68.9 kg (152 lb)   SpO2 99%   BMI 21.05 kg/m²      Physical Exam  Vitals and nursing note reviewed.   Constitutional:       General: He is not in acute distress.     Appearance: He is " well-developed.   HENT:      Head: Normocephalic and atraumatic.   Eyes:      Conjunctiva/sclera: Conjunctivae normal.   Cardiovascular:      Rate and Rhythm: Normal rate and regular rhythm.      Heart sounds: No murmur heard.  Pulmonary:      Effort: Pulmonary effort is normal. No respiratory distress.      Breath sounds: Normal breath sounds.   Abdominal:      Palpations: Abdomen is soft.      Tenderness: There is no abdominal tenderness.   Musculoskeletal:         General: No swelling, tenderness, deformity or signs of injury. Normal range of motion.      Cervical back: Neck supple.      Right lower leg: No edema.   Skin:     General: Skin is warm and dry.      Capillary Refill: Capillary refill takes less than 2 seconds.      Findings: Rash present.   Neurological:      General: No focal deficit present.      Mental Status: He is alert.   Psychiatric:         Mood and Affect: Mood normal.

## 2025-01-06 NOTE — ASSESSMENT & PLAN NOTE
Orders:    Ambulatory Referral to Podiatry; Future    XR foot 3+ vw right; Future    Comprehensive metabolic panel; Future    CBC and differential; Future    naproxen (Naprosyn) 500 mg tablet; Take 1 tablet (500 mg total) by mouth 2 (two) times a day with meals

## 2025-01-07 ENCOUNTER — RESULTS FOLLOW-UP (OUTPATIENT)
Dept: FAMILY MEDICINE CLINIC | Facility: CLINIC | Age: 25
End: 2025-01-07

## 2025-01-07 NOTE — TELEPHONE ENCOUNTER
----- Message from Janki Cueva MD sent at 1/7/2025  9:37 AM EST -----  Patient has some arthritis in foot but otherwise ok

## 2025-01-13 ENCOUNTER — OFFICE VISIT (OUTPATIENT)
Age: 25
End: 2025-01-13
Payer: COMMERCIAL

## 2025-01-13 VITALS — BODY MASS INDEX: 21.28 KG/M2 | WEIGHT: 152 LBS | HEIGHT: 71 IN

## 2025-01-13 DIAGNOSIS — M21.611 HALLUX ABDUCTOVALGUS WITH BUNIONS, RIGHT: Primary | ICD-10-CM

## 2025-01-13 DIAGNOSIS — M20.11 HALLUX ABDUCTOVALGUS WITH BUNIONS, RIGHT: Primary | ICD-10-CM

## 2025-01-13 DIAGNOSIS — M79.671 RIGHT FOOT PAIN: ICD-10-CM

## 2025-01-13 DIAGNOSIS — M21.41 PES PLANUS OF RIGHT FOOT: ICD-10-CM

## 2025-01-13 PROCEDURE — 99203 OFFICE O/P NEW LOW 30 MIN: CPT | Performed by: STUDENT IN AN ORGANIZED HEALTH CARE EDUCATION/TRAINING PROGRAM

## 2025-01-13 NOTE — PATIENT INSTRUCTIONS
Purchase a supportive pair of sneakers such as Agosto, Hoka, Saucony, New Balance, On Cloud, Altra.  Some qualities to look for is that the shoe should bend only where the toes bend, the sole should not be too flimsy, it should have a wide toe box and a somewhat stiff heel support. Purchase a supportive over-the-counter pair of inserts such as Superfeet, powersteps, tread labs.    [employer]  [address] [facility] [address]  [phone]     [employer]  [address] [facility] [address], [place] [address]  [phone]     [employer]  [address] [place] [address] [phone]     [place] [employer]   [address] [facility]  [phone]     [employer]  [address], [place], [place] [address] [phone]     [employer]  [address] [place], [place] [id number] [phone]     [facility] [employer] [facility]   [address] [place] [address]  [phone]     [employer]  [address] [place], [place]  [phone]     [employer] [address], [address]  [phone]     [employer]  [address] [place] [address], [address], [place] [address] [phone]

## 2025-01-13 NOTE — PROGRESS NOTES
[facility] Office Visit    ASSESSMENT     Diagnoses and all orders for this visit:    Hallux abductovalgus with bunions, right    Right foot pain  -     Ambulatory Referral to Podiatry    Pes planus of right foot         Problem List Items Addressed This Visit          Surgery/Wound/Pain    Right foot pain     Other Visit Diagnoses         Hallux abductovalgus with bunions, right    -  Primary      Pes planus of right foot              PLAN  -Patient was educated regarding their condition.  -We discussed conservative treatment. The patient has elected to attempt conservative therapy for now. I explained to him that although this will not correct their deformity, it may be helpful in reducing the pain.  -Recommend purchase of supportive shoes with wide toe box. Discussed the use of toe spacers and silicone bunion pads. Recommend purchase of OTC orthosis (superfeet, powersteps, or tread labs).  -RTC as needed for new or worsening podiatric concerns    -X-ray from 1/13/2025 of the right foot interpreted independently. AP, Lateral views noted. No fractures noted. IM angle 8.7 although foot structure is difficult to fully assess given nonweightbearing nature of the films      SUBJECTIVE    Chief Complaint:  Right foot pain     Patient ID: Brett Mercedes is a pleasant 24 year old male who presents today with right foot pain. He states that he has had soreness and pain since late November. He states that one day he was out running and that's when the pain started. He denies any injury. He did go to see his PCP where they obtained x-rays however they told him that they're no significant findings. He states that as of right now there is more soreness than pain. He feels it the most while walking. He just bought a different type of shoe which has been helpful.         The following portions of the patient's history were reviewed and updated as appropriate: allergies, current  "medications, past family history, past medical history, past social history, past surgical history and problem list.    Review of Systems   Constitutional: Negative.    Respiratory: Negative.     Cardiovascular: Negative.    Gastrointestinal: Negative.    Genitourinary: Negative.    Musculoskeletal:  Positive for arthralgias.   Skin: Negative.          OBJECTIVE      Ht 5' 11.25\" (1.81 m)   Wt 68.9 kg (152 lb)   BMI 21.05 kg/m²        Physical Exam  Constitutional:       Appearance: Normal appearance.   HENT:      Head: Normocephalic and atraumatic.   Eyes:      General:         Right eye: No discharge.         Left eye: No discharge.   Cardiovascular:      Rate and Rhythm: Normal rate and regular rhythm.      Pulses:           Dorsalis pedis pulses are 2+ on the right side and 2+ on the left side.        Posterior tibial pulses are 2+ on the right side and 2+ on the left side.   Pulmonary:      Effort: Pulmonary effort is normal.      Breath sounds: Normal breath sounds.   Skin:     General: Skin is warm.      Capillary Refill: Capillary refill takes less than 2 seconds.   Neurological:      Mental Status: He is alert and oriented to person, place, and time.      Sensory: Sensation is intact. No sensory deficit.   Psychiatric:         Mood and Affect: Mood normal.         MSK right foot:  -Pain on palpation negative  -Hallux IPJ ROM WNL  -Active range of motion lesser digits intact  -MMT 5/5 to all muscle compartments of the lower extremity  -I note a mild prominence at the medial aspect of the 1st MTPJ of the right foot. The hallux is in an abducted position with slight abutment of the second toe.   -Hallux abductovalgus deformity is tracking  -1st MTPJ ROM is 65 degrees dorsiflexion, 20 degrees plantarflexion with no crepitus noted  -TMTJ hypermobility is noted  -Pes planus noted to the right foot    Derm:  -No lesions, abrasions, or open wounds noted  -erythema not present  -Calluses are not " present    Vascular:  -DP and PT pulses intact b/l  -Capillary refill time <2 sec b/l  -Digital hair growth: Present  -Skin temp: WNL    Neuro:  -Light sensation intact  -Protective sensation intact